# Patient Record
Sex: FEMALE | Race: WHITE | NOT HISPANIC OR LATINO | URBAN - METROPOLITAN AREA
[De-identification: names, ages, dates, MRNs, and addresses within clinical notes are randomized per-mention and may not be internally consistent; named-entity substitution may affect disease eponyms.]

---

## 2017-03-25 ENCOUNTER — INPATIENT (INPATIENT)
Facility: HOSPITAL | Age: 82
LOS: 2 days | Discharge: HOME | DRG: 57 | End: 2017-03-28
Attending: SPECIALIST | Admitting: SPECIALIST
Payer: MEDICARE

## 2017-03-25 VITALS
HEART RATE: 95 BPM | SYSTOLIC BLOOD PRESSURE: 198 MMHG | TEMPERATURE: 98 F | RESPIRATION RATE: 18 BRPM | OXYGEN SATURATION: 100 % | DIASTOLIC BLOOD PRESSURE: 108 MMHG
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
## 2017-03-25 DIAGNOSIS — E03.9 HYPOTHYROIDISM, UNSPECIFIED: ICD-10-CM

## 2017-03-25 DIAGNOSIS — I63.9 CEREBRAL INFARCTION, UNSPECIFIED: ICD-10-CM

## 2017-03-25 DIAGNOSIS — N17.9 ACUTE KIDNEY FAILURE, UNSPECIFIED: ICD-10-CM

## 2017-03-25 DIAGNOSIS — D72.829 ELEVATED WHITE BLOOD CELL COUNT, UNSPECIFIED: ICD-10-CM

## 2017-03-25 LAB
ALBUMIN SERPL ELPH-MCNC: 4.1 G/DL — SIGNIFICANT CHANGE UP (ref 3.4–5)
ALP SERPL-CCNC: 131 U/L — HIGH (ref 40–120)
ALT FLD-CCNC: 17 U/L — SIGNIFICANT CHANGE UP (ref 12–42)
ANION GAP SERPL CALC-SCNC: 9 MMOL/L — SIGNIFICANT CHANGE UP (ref 9–16)
APTT BLD: 33.2 SEC — SIGNIFICANT CHANGE UP (ref 27.5–37.4)
AST SERPL-CCNC: 12 U/L — LOW (ref 15–37)
BASOPHILS NFR BLD AUTO: 0.1 % — SIGNIFICANT CHANGE UP (ref 0–2)
BILIRUB SERPL-MCNC: 0.5 MG/DL — SIGNIFICANT CHANGE UP (ref 0.2–1.2)
BLD GP AB SCN SERPL QL: NEGATIVE — SIGNIFICANT CHANGE UP
BUN SERPL-MCNC: 53 MG/DL — HIGH (ref 7–23)
CALCIUM SERPL-MCNC: 9.2 MG/DL — SIGNIFICANT CHANGE UP (ref 8.5–10.5)
CHLORIDE SERPL-SCNC: 109 MMOL/L — HIGH (ref 96–108)
CK MB CFR SERPL CALC: 2.9 NG/ML — SIGNIFICANT CHANGE UP (ref 0.5–3.6)
CK SERPL-CCNC: 76 U/L — SIGNIFICANT CHANGE UP (ref 26–192)
CO2 SERPL-SCNC: 20 MMOL/L — LOW (ref 22–31)
CREAT SERPL-MCNC: 1.47 MG/DL — HIGH (ref 0.5–1.3)
GLUCOSE SERPL-MCNC: 88 MG/DL — SIGNIFICANT CHANGE UP (ref 70–99)
HCT VFR BLD CALC: 42.6 % — SIGNIFICANT CHANGE UP (ref 34.5–45)
HGB BLD-MCNC: 14.3 G/DL — SIGNIFICANT CHANGE UP (ref 11.5–15.5)
INR BLD: 1.08 — SIGNIFICANT CHANGE UP (ref 0.88–1.16)
LYMPHOCYTES # BLD AUTO: 59.9 % — HIGH (ref 13–44)
MCHC RBC-ENTMCNC: 30.4 PG — SIGNIFICANT CHANGE UP (ref 27–34)
MCHC RBC-ENTMCNC: 33.6 G/DL — SIGNIFICANT CHANGE UP (ref 32–36)
MCV RBC AUTO: 90.6 FL — SIGNIFICANT CHANGE UP (ref 80–100)
MONOCYTES NFR BLD AUTO: 3.9 % — SIGNIFICANT CHANGE UP (ref 2–14)
NEUTROPHILS NFR BLD AUTO: 35.7 % — LOW (ref 43–77)
PLATELET # BLD AUTO: 250 K/UL — SIGNIFICANT CHANGE UP (ref 150–400)
POTASSIUM SERPL-MCNC: 5.1 MMOL/L — SIGNIFICANT CHANGE UP (ref 3.5–5.3)
POTASSIUM SERPL-SCNC: 5.1 MMOL/L — SIGNIFICANT CHANGE UP (ref 3.5–5.3)
PROT SERPL-MCNC: 9.2 G/DL — HIGH (ref 6.4–8.2)
PROTHROM AB SERPL-ACNC: 12 SEC — SIGNIFICANT CHANGE UP (ref 9.8–12.7)
RBC # BLD: 4.7 M/UL — SIGNIFICANT CHANGE UP (ref 3.8–5.2)
RBC # FLD: 14.6 % — SIGNIFICANT CHANGE UP (ref 10.3–16.9)
RH IG SCN BLD-IMP: POSITIVE — SIGNIFICANT CHANGE UP
SODIUM SERPL-SCNC: 138 MMOL/L — SIGNIFICANT CHANGE UP (ref 135–145)
TROPONIN I SERPL-MCNC: <0.015 NG/ML — SIGNIFICANT CHANGE UP (ref 0.01–0.04)
WBC # BLD: 15.9 K/UL — HIGH (ref 3.8–10.5)
WBC # FLD AUTO: 15.9 K/UL — HIGH (ref 3.8–10.5)

## 2017-03-25 PROCEDURE — 93010 ELECTROCARDIOGRAM REPORT: CPT

## 2017-03-25 PROCEDURE — 70450 CT HEAD/BRAIN W/O DYE: CPT | Mod: 26

## 2017-03-25 PROCEDURE — 99285 EMERGENCY DEPT VISIT HI MDM: CPT | Mod: 25

## 2017-03-25 PROCEDURE — 73030 X-RAY EXAM OF SHOULDER: CPT | Mod: 26,LT

## 2017-03-25 PROCEDURE — 71010: CPT | Mod: 26

## 2017-03-25 RX ORDER — SODIUM CHLORIDE 9 MG/ML
1000 INJECTION INTRAMUSCULAR; INTRAVENOUS; SUBCUTANEOUS
Qty: 0 | Refills: 0 | Status: DISCONTINUED | OUTPATIENT
Start: 2017-03-25 | End: 2017-03-26

## 2017-03-25 RX ORDER — SODIUM CHLORIDE 9 MG/ML
3 INJECTION INTRAMUSCULAR; INTRAVENOUS; SUBCUTANEOUS ONCE
Qty: 0 | Refills: 0 | Status: COMPLETED | OUTPATIENT
Start: 2017-03-25 | End: 2017-03-25

## 2017-03-25 RX ORDER — HEPARIN SODIUM 5000 [USP'U]/ML
5000 INJECTION INTRAVENOUS; SUBCUTANEOUS EVERY 12 HOURS
Qty: 0 | Refills: 0 | Status: DISCONTINUED | OUTPATIENT
Start: 2017-03-25 | End: 2017-03-28

## 2017-03-25 RX ORDER — HALOPERIDOL DECANOATE 100 MG/ML
2 INJECTION INTRAMUSCULAR ONCE
Qty: 0 | Refills: 0 | Status: COMPLETED | OUTPATIENT
Start: 2017-03-25 | End: 2017-03-25

## 2017-03-25 RX ORDER — DIPHENHYDRAMINE HCL 50 MG
25 CAPSULE ORAL ONCE
Qty: 0 | Refills: 0 | Status: COMPLETED | OUTPATIENT
Start: 2017-03-25 | End: 2017-03-25

## 2017-03-25 RX ADMIN — SODIUM CHLORIDE 125 MILLILITER(S): 9 INJECTION INTRAMUSCULAR; INTRAVENOUS; SUBCUTANEOUS at 20:39

## 2017-03-25 RX ADMIN — SODIUM CHLORIDE 3 MILLILITER(S): 9 INJECTION INTRAMUSCULAR; INTRAVENOUS; SUBCUTANEOUS at 20:39

## 2017-03-25 RX ADMIN — Medication 25 MILLIGRAM(S): at 22:23

## 2017-03-25 RX ADMIN — SODIUM CHLORIDE 125 MILLILITER(S): 9 INJECTION INTRAMUSCULAR; INTRAVENOUS; SUBCUTANEOUS at 20:52

## 2017-03-25 NOTE — H&P ADULT - NSHPLABSRESULTS_GEN_ALL_CORE
.  LABS:                         14.3   15.9  )-----------( 250      ( 25 Mar 2017 20:44 )             42.6     25 Mar 2017 20:44    138    |  109    |  53     ----------------------------<  88     5.1     |  20     |  1.47     Ca    9.2        25 Mar 2017 20:44    TPro  9.2    /  Alb  4.1    /  TBili  0.5    /  DBili  x      /  AST  12     /  ALT  17     /  AlkPhos  131    25 Mar 2017 20:44    PT/INR - ( 25 Mar 2017 20:44 )   PT: 12.0 sec;   INR: 1.08          PTT - ( 25 Mar 2017 20:44 )  PTT:33.2 sec              RADIOLOGY, EKG & ADDITIONAL TESTS: Reviewed. .  LABS:                         14.3   15.9  )-----------( 250      ( 25 Mar 2017 20:44 )             42.6     25 Mar 2017 20:44    138    |  109    |  53     ----------------------------<  88     5.1     |  20     |  1.47     Ca    9.2        25 Mar 2017 20:44    TPro  9.2    /  Alb  4.1    /  TBili  0.5    /  DBili  x      /  AST  12     /  ALT  17     /  AlkPhos  131    25 Mar 2017 20:44    PT/INR - ( 25 Mar 2017 20:44 )   PT: 12.0 sec;   INR: 1.08          PTT - ( 25 Mar 2017 20:44 )  PTT:33.2 sec      EXAM:  CT BRAIN                          PROCEDURE DATE:  03/25/2017         IMPRESSION:  No hydrocephalus, midline shift, acute intracranial hemorrhage or   demarcated territorial infarct.  Chronic white matter microangiopathic ischemic disease. Chronic right   thalamic lacunar infarct, also noted on the prior study.      CXR: no official read. +vasc congestion, no infiltrates.  EKG: NSR with incomplete LBBB.

## 2017-03-25 NOTE — H&P ADULT - PROBLEM SELECTOR PLAN 2
-will get urine lytes, pt more on dry side on exam  -has no urinary symptoms  -will get collateral from Dr. Fuller in AM for baseline labs  -renally adjust all meds and avoid nephrotoxic agents

## 2017-03-25 NOTE — H&P ADULT - PROBLEM SELECTOR PLAN 6
-will check TSH  -will try to confirm synthroid dose with Dr. Fuller in AM-patient unaware of dose. As per outpt meds patient was taking 50mcg since September so will continue this dose for now pending TSH

## 2017-03-25 NOTE — ED ADULT TRIAGE NOTE - CHIEF COMPLAINT QUOTE
Pt co left arm and shoulder pain status post fall in the restroom. Pt Pt co left arm and shoulder pain status post fall at home. Pt has left side facial droop with R pupil nonreactive to light. Garcia injury to the head. Oriented to Person and place. L shoulder appear deformed. Limited ROM to L arm and shoulder.

## 2017-03-25 NOTE — H&P ADULT - PROBLEM SELECTOR PLAN 5
-will check TSH  -will try to confirm synthroid dose with Dr. Fuller in AM-patient unaware of dose. As per outpt meds patient was taking 50mcg since September so will continue this dose for now pending TSH -old CVA found on CT  -patient would benefit from aspirin and possibly statin however given's patient age and non-compliance pt may not be ideal candidate for starting new medications  -will check lipid panel

## 2017-03-25 NOTE — H&P ADULT - PMH
CVA (cerebral vascular accident)    Hypertension    Hypothyroidism    Movement disorder CVA (cerebral vascular accident)    Dementia    Hypertension    Hypothyroidism    Movement disorder

## 2017-03-25 NOTE — ED PROVIDER NOTE - OBJECTIVE STATEMENT
89 F co fall- was in the bathroom, stood up and fell- does not recall being dizzy/lightheaded denies LOC- states she lives alone- but reportedly living with someone who is unable to care for her  denies pain no n/v no weakness- co chronic shoulder immobility for years- uncertain of the reason  no exac/allev factors

## 2017-03-25 NOTE — H&P ADULT - NSHPPHYSICALEXAM_GEN_ALL_CORE
.  VITAL SIGNS:  T(C): 36.7, Max: 36.7 (03-25 @ 22:29)  T(F): 98, Max: 98 (03-25 @ 22:29)  HR: 84 (84 - 95)  BP: 169/74 (169/74 - 198/108)  BP(mean): --  RR: 18 (18 - 18)  SpO2: 100% (100% - 100%)  Wt(kg): --    PHYSICAL EXAM:    Constitutional: WDWN resting comfortably in bed; NAD  Head: NC/AT  Eyes: PERRL, EOMI, anicteric sclera  ENT: no nasal discharge; uvula midline, no oropharyngeal erythema or exudates; MMM  Neck: supple; no JVD or thyromegaly  Respiratory: CTA B/L; no W/R/R, no retractions  Cardiac: +S1/S2; RRR; no M/R/G; PMI non-displaced  Gastrointestinal: soft, NT/ND; no rebound or guarding; +BSx4  Genitourinary: normal external genitalia  Back: spine midline, no bony tenderness or step-offs; no CVAT B/L  Extremities: WWP, no clubbing or cyanosis; no peripheral edema  Musculoskeletal: NROM x4; no joint swelling, tenderness or erythema  Vascular: 2+ radial, femoral, DP/PT pulses B/L  Dermatologic: skin warm, dry and intact; no rashes, wounds, or scars  Lymphatic: no submandibular or cervical LAD  Neurologic: AAOx3; CNII-XII grossly intact; no focal deficits  Psychiatric: affect and characteristics of appearance, verbalizations, behaviors are appropriate .  VITAL SIGNS:  T(C): 36.7, Max: 36.7 (03-25 @ 22:29)  T(F): 98, Max: 98 (03-25 @ 22:29)  HR: 84 (84 - 95)  BP: 169/74 (169/74 - 198/108)  BP(mean): --  RR: 18 (18 - 18)  SpO2: 100% (100% - 100%)  Wt(kg): --    PHYSICAL EXAM:    Constitutional: WDWN resting comfortably in bed; NAD. Frail.  Head: NC/AT  Eyes: PERRL, EOMI, anicteric sclera  ENT: no nasal discharge; uvula midline, no oropharyngeal erythema or exudates; dry MM. No lesions or signs of injury.  Neck: supple; no JVD or thyromegaly  Respiratory: CTA B/L; no W/R/R, no retractions  Cardiac: +S1/S2; RRR; +2/6 sys murmur; PMI non-displaced  Gastrointestinal: soft, NT/ND; no rebound or guarding; +BSx4  Genitourinary: normal external genitalia  Back: spine midline, no bony tenderness or step-offs; no CVAT B/L  Extremities: WWP, no clubbing or cyanosis; no peripheral edema  Musculoskeletal: NROM x3; no joint swelling, tenderness or erythema. mild limitation of ROM at left shoulder.  Vascular: 2+ radial, femoral, DP/PT pulses B/L  Dermatologic: skin warm, dry and intact; no rashes, wounds, or scars  Lymphatic: no submandibular or cervical LAD  Neurologic: AAOx2; CNII-XII grossly intact; no focal deficits  Psychiatric: affect and characteristics of appearance, verbalizations, behaviors are appropriate .  VITAL SIGNS:  T(C): 36.7, Max: 36.7 (03-25 @ 22:29)  T(F): 98, Max: 98 (03-25 @ 22:29)  HR: 84 (84 - 95)  BP: 169/74 (169/74 - 198/108)  BP(mean): --  RR: 18 (18 - 18)  SpO2: 100% (100% - 100%)  Wt(kg): --    PHYSICAL EXAM:    Constitutional: WDWN resting comfortably in bed; NAD. Frail.  Head: NC/AT  Eyes: PERRL, EOMI, anicteric sclera  ENT: no nasal discharge; uvula midline, no oropharyngeal erythema or exudates; dry MM. No lesions or signs of injury.  Neck: supple; no JVD or thyromegaly  Respiratory: CTA B/L; no W/R/R, no retractions  Cardiac: +S1/S2; RRR; +2/6 sys murmur; PMI non-displaced  Gastrointestinal: soft, NT/ND; no rebound or guarding; +BSx4  Genitourinary: normal external genitalia  Back: spine midline, no bony tenderness or step-offs; no CVAT B/L  Extremities: WWP, no clubbing or cyanosis; no peripheral edema  Musculoskeletal: NROM x3; no joint swelling, tenderness or erythema. mild limitation of ROM at left shoulder.  Vascular: 2+ radial, femoral, DP/PT pulses B/L  Dermatologic: skin warm, dry and intact; no rashes, wounds, or scars  Lymphatic: no submandibular or cervical LAD  Neurologic: AAOx2; CNII-XII grossly intact; no focal deficits. +facial tremor and b/l hands-chronic. Strength 5/5 in all ext. Sensation intact.   Psychiatric: affect and characteristics of appearance, verbalizations, behaviors are appropriate

## 2017-03-25 NOTE — ED PROVIDER NOTE - MUSCULOSKELETAL, MLM
Spine appears normal, range of motion is not limited, no muscle or joint tenderness L shoulder probable dislocation severely limited ROM deneis pain, pulses intact

## 2017-03-25 NOTE — H&P ADULT - PROBLEM SELECTOR PLAN 1
-unclear etiology currently. Initial workup shows possible infection with white count although patient has no symptoms. Will get UA, CXR clear.  -EKG shows NSR, will r/o ACS with trops X2.   -Will get TSH  -will check orthostatics although seems as if patient was sitting during episode  -Pt frail and lives alone-will get PT consult and social work involved

## 2017-03-25 NOTE — H&P ADULT - ASSESSMENT
Patient is a 89 yo F with PMHx of hypothyroidism, HLD, hypercoagulable state, ?movement disorder, chronic left shoulder dislocation with fall. Patient is a 89 yo F with PMHx of dementia, hypothyroidism, HLD, hypercoagulable state, ?movement disorder, chronic left shoulder dislocation with fall.

## 2017-03-25 NOTE — H&P ADULT - HISTORY OF PRESENT ILLNESS
Patient is a 89 yo F with PMHx of hypothyroidism, HLD, hypercoagulable state, ?movement disorder, chronic left shoulder dislocation who presented to ED today after having fall in bathroom. Pt states she was sitting and then fell over and landing on her front. She denies any injury. She remembers the incident and denies any prodromal symptoms including chest pain, palpitations, SOB, diaphoresis. She denies any URI infection symptoms, fevers, chills, abd pain complaints or urinary complaints. No rashes. She denies any neurologic symptoms    She only takes synthroid at home. Has stopped all other meds on her own bc of side effects she states. She was told she has a movement disorder but has not been taking anything for it (outpt med rec shows old script of Sinemet). She denies having CVA in past. She usually ambulates with a walker. She has someone come once a week to clean her apartment but other wise she manages her own affairs including cooking. She lives in an elevator building. Patient is a 89 yo F with PMHx of dementia, hypothyroidism, HLD, hypercoagulable state, ?movement disorder, chronic left shoulder dislocation who presented to ED today after having fall in bathroom. Pt states she was sitting and then fell over and landing on her front. She denies any injury. She remembers the incident and denies any prodromal symptoms including chest pain, palpitations, SOB, diaphoresis. She denies any URI infection symptoms, fevers, chills, abd pain complaints or urinary complaints. No rashes. She denies any neurologic symptoms    She only takes synthroid at home. Has stopped all other meds on her own bc of side effects she states. She was told she has a movement disorder but has not been taking anything for it (outpt med rec shows old script of Sinemet). She denies having CVA in past. She usually ambulates with a walker. She has someone come once a week to clean her apartment but other wise she manages her own affairs including cooking. She lives in an elevator building.

## 2017-03-26 DIAGNOSIS — R63.8 OTHER SYMPTOMS AND SIGNS CONCERNING FOOD AND FLUID INTAKE: ICD-10-CM

## 2017-03-26 DIAGNOSIS — Z86.718 PERSONAL HISTORY OF OTHER VENOUS THROMBOSIS AND EMBOLISM: ICD-10-CM

## 2017-03-26 DIAGNOSIS — R74.8 ABNORMAL LEVELS OF OTHER SERUM ENZYMES: ICD-10-CM

## 2017-03-26 DIAGNOSIS — R01.1 CARDIAC MURMUR, UNSPECIFIED: ICD-10-CM

## 2017-03-26 DIAGNOSIS — Z41.8 ENCOUNTER FOR OTHER PROCEDURES FOR PURPOSES OTHER THAN REMEDYING HEALTH STATE: ICD-10-CM

## 2017-03-26 DIAGNOSIS — I10 ESSENTIAL (PRIMARY) HYPERTENSION: ICD-10-CM

## 2017-03-26 DIAGNOSIS — G25.9 EXTRAPYRAMIDAL AND MOVEMENT DISORDER, UNSPECIFIED: ICD-10-CM

## 2017-03-26 LAB
ALBUMIN SERPL ELPH-MCNC: 2.9 G/DL — LOW (ref 3.4–5)
ALP SERPL-CCNC: 100 U/L — SIGNIFICANT CHANGE UP (ref 40–120)
ALT FLD-CCNC: 13 U/L — SIGNIFICANT CHANGE UP (ref 12–42)
ANION GAP SERPL CALC-SCNC: 10 MMOL/L — SIGNIFICANT CHANGE UP (ref 9–16)
AST SERPL-CCNC: 12 U/L — LOW (ref 15–37)
BILIRUB SERPL-MCNC: 0.7 MG/DL — SIGNIFICANT CHANGE UP (ref 0.2–1.2)
BUN SERPL-MCNC: 51 MG/DL — HIGH (ref 7–23)
CALCIUM SERPL-MCNC: 8.3 MG/DL — LOW (ref 8.5–10.5)
CHLORIDE SERPL-SCNC: 113 MMOL/L — HIGH (ref 96–108)
CHOLEST SERPL-MCNC: 155 MG/DL — SIGNIFICANT CHANGE UP
CK SERPL-CCNC: 69 U/L — SIGNIFICANT CHANGE UP (ref 26–192)
CO2 SERPL-SCNC: 19 MMOL/L — LOW (ref 22–31)
CREAT SERPL-MCNC: 1.4 MG/DL — HIGH (ref 0.5–1.3)
GLUCOSE SERPL-MCNC: 75 MG/DL — SIGNIFICANT CHANGE UP (ref 70–99)
HCT VFR BLD CALC: 35.9 % — SIGNIFICANT CHANGE UP (ref 34.5–45)
HDLC SERPL-MCNC: 34 MG/DL — LOW
HGB BLD-MCNC: 11.7 G/DL — SIGNIFICANT CHANGE UP (ref 11.5–15.5)
LIPID PNL WITH DIRECT LDL SERPL: 87 MG/DL — SIGNIFICANT CHANGE UP
MAGNESIUM SERPL-MCNC: 1.9 MG/DL — SIGNIFICANT CHANGE UP (ref 1.6–2.4)
MCHC RBC-ENTMCNC: 30 PG — SIGNIFICANT CHANGE UP (ref 27–34)
MCHC RBC-ENTMCNC: 32.6 G/DL — SIGNIFICANT CHANGE UP (ref 32–36)
MCV RBC AUTO: 92.1 FL — SIGNIFICANT CHANGE UP (ref 80–100)
PLATELET # BLD AUTO: 247 K/UL — SIGNIFICANT CHANGE UP (ref 150–400)
POTASSIUM SERPL-MCNC: 5.1 MMOL/L — SIGNIFICANT CHANGE UP (ref 3.5–5.3)
POTASSIUM SERPL-SCNC: 5.1 MMOL/L — SIGNIFICANT CHANGE UP (ref 3.5–5.3)
PROT SERPL-MCNC: 6.9 G/DL — SIGNIFICANT CHANGE UP (ref 6.4–8.2)
RBC # BLD: 3.9 M/UL — SIGNIFICANT CHANGE UP (ref 3.8–5.2)
RBC # FLD: 14 % — SIGNIFICANT CHANGE UP (ref 10.3–16.9)
SODIUM SERPL-SCNC: 142 MMOL/L — SIGNIFICANT CHANGE UP (ref 135–145)
TOTAL CHOLESTEROL/HDL RATIO MEASUREMENT: 4.6 RATIO — SIGNIFICANT CHANGE UP
TRIGL SERPL-MCNC: 170 MG/DL — HIGH
TROPONIN I SERPL-MCNC: 0.09 NG/ML — HIGH (ref 0.01–0.04)
TROPONIN I SERPL-MCNC: 0.23 NG/ML — HIGH (ref 0.01–0.04)
TSH SERPL-MCNC: 1.82 UIU/ML — SIGNIFICANT CHANGE UP (ref 0.35–4.94)
WBC # BLD: 15.5 K/UL — HIGH (ref 3.8–10.5)
WBC # FLD AUTO: 15.5 K/UL — HIGH (ref 3.8–10.5)

## 2017-03-26 RX ORDER — AMLODIPINE BESYLATE 2.5 MG/1
5 TABLET ORAL DAILY
Qty: 0 | Refills: 0 | Status: COMPLETED | OUTPATIENT
Start: 2017-03-26 | End: 2017-03-26

## 2017-03-26 RX ORDER — LEVOTHYROXINE SODIUM 125 MCG
50 TABLET ORAL DAILY
Qty: 0 | Refills: 0 | Status: DISCONTINUED | OUTPATIENT
Start: 2017-03-26 | End: 2017-03-28

## 2017-03-26 RX ORDER — SODIUM CHLORIDE 9 MG/ML
1000 INJECTION INTRAMUSCULAR; INTRAVENOUS; SUBCUTANEOUS
Qty: 0 | Refills: 0 | Status: DISCONTINUED | OUTPATIENT
Start: 2017-03-26 | End: 2017-03-27

## 2017-03-26 RX ORDER — AMLODIPINE BESYLATE 2.5 MG/1
5 TABLET ORAL DAILY
Qty: 0 | Refills: 0 | Status: DISCONTINUED | OUTPATIENT
Start: 2017-03-27 | End: 2017-03-28

## 2017-03-26 RX ORDER — LEVOTHYROXINE SODIUM 125 MCG
1 TABLET ORAL
Qty: 0 | Refills: 0 | COMMUNITY

## 2017-03-26 RX ADMIN — HEPARIN SODIUM 5000 UNIT(S): 5000 INJECTION INTRAVENOUS; SUBCUTANEOUS at 07:01

## 2017-03-26 RX ADMIN — AMLODIPINE BESYLATE 5 MILLIGRAM(S): 2.5 TABLET ORAL at 07:00

## 2017-03-26 RX ADMIN — SODIUM CHLORIDE 80 MILLILITER(S): 9 INJECTION INTRAMUSCULAR; INTRAVENOUS; SUBCUTANEOUS at 22:04

## 2017-03-26 RX ADMIN — HEPARIN SODIUM 5000 UNIT(S): 5000 INJECTION INTRAVENOUS; SUBCUTANEOUS at 18:54

## 2017-03-26 RX ADMIN — Medication 50 MICROGRAM(S): at 07:01

## 2017-03-26 NOTE — PHYSICAL THERAPY INITIAL EVALUATION ADULT - IMPAIRMENTS FOUND, PT EVAL
poor safety awareness/aerobic capacity/endurance/gait, locomotion, and balance/muscle strength/joint integrity and mobility/gross motor/posture

## 2017-03-26 NOTE — PHYSICAL THERAPY INITIAL EVALUATION ADULT - ADDITIONAL COMMENTS
Patient lives alone in an elevator apartment. Patient reports ambulating minimally prior to admission with a RW. However, patient is a poor historian.

## 2017-03-26 NOTE — PROGRESS NOTE ADULT - SUBJECTIVE AND OBJECTIVE BOX
Patient is a 89 yo F with PMHx of mild dementia functional, hypothyroidism, HLD, hypercoagulable state, ?movement disorder, chronic left shoulder dislocation who presented to ED today after having fall in bathroom. Pt states she was sitting and then fell over and landing on her front. She denies any injury. She remembers the incident and denies any prodromal symptoms including chest pain, palpitations, SOB, diaphoresis. She denies any URI infection symptoms, fevers, chills, abd pain complaints or urinary complaints. No rashes. She denies any neurologic symptoms    She only takes synthroid at home. Has stopped all other meds on her own bc of side effects she states. She was told she has a movement disorder but has not been taking anything for it (outpt med rec shows old script of Sinemet). She denies having CVA in past. She usually ambulates with a walker. She has someone come once a week to clean her apartment but other wise she manages her own affairs including cooking. She lives in an elevator building. (25 Mar 2017 23:36)      REVIEW OF SYSTEMS:  Constitutional: No fever, weight loss or fatigue  ENMT:  No difficulty hearing, tinnitus, vertigo; No sinus or throat pain  Respiratory: No cough, wheezing, chills or hemoptysis  Cardiovascular: No chest pain, palpitations, dizziness or leg swelling  Gastrointestinal: No abdominal or epigastric pain. No nausea, vomiting or hematemesis; No diarrhea or constipation. No melena or hematochezia.  Skin: No itching, burning, rashes or lesions   Musculoskeletal: No joint pain or swelling; No muscle, back or extremity pain    PAST MEDICAL & SURGICAL HISTORY:  DVT with hypercoagulable state not on AC because of risk of fall  Dementia  CVA (cerebral vascular accident)  Movement disorder (Parkinsons)  Hypertension  Hypothyroidism      FAMILY HISTORY:      SOCIAL HISTORY:  Smoking Status: [ ] Current, [ ] Former, [ ] Never  Pack Years:    MEDICATIONS:  MEDICATIONS  (STANDING):  heparin  Injectable 5000Unit(s) SubCutaneous every 12 hours  levothyroxine 50MICROGram(s) Oral daily  sodium chloride 0.9%. 1000milliLiter(s) IV Continuous <Continuous>    MEDICATIONS  (PRN):      Allergies    No Known Allergies    Intolerances        Vital Signs Last 24 Hrs  T(C): 36.6, Max: 37.1 (03-25 @ 23:30)  T(F): 97.8, Max: 98.7 (03-25 @ 23:30)  HR: 76 (62 - 95)  BP: 134/61 (134/61 - 198/108)  BP(mean): --  RR: 20 (18 - 20)  SpO2: 98% (97% - 100%)  I & Os for 24h ending 03-26 @ 07:00  =============================================  IN: 767.5 ml / OUT: 0 ml / NET: 767.5 ml    I & Os for current day (as of 03-26 @ 10:20)  =============================================  IN: 80 ml / OUT: 0 ml / NET: 80 ml        PHYSICAL EXAM:    General: Well developed; well nourished; elderly, oriented,  in no acute distress  HEENT: MMM, conjunctiva and sclera clear  Lungs: poor inspiratory effort but clear  Heart: + murmur  Gastrointestinal: Soft, non-tender non-distended; Normal bowel sounds; No rebound or guarding  Extremities: Normal range of motion, No clubbing, cyanosis or edema  Neurological: Alert and oriented x3  Skin: Warm and dry. No obvious rash      LABS:                        11.7   15.5  )-----------( 247      ( 26 Mar 2017 06:41 )             35.9     26 Mar 2017 06:41    142    |  113    |  51     ----------------------------<  75     5.1     |  19     |  1.40     Ca    8.3        26 Mar 2017 06:41  Mg     1.9       26 Mar 2017 06:41    TPro  6.9    /  Alb  2.9    /  TBili  0.7    /  DBili  x      /  AST  12     /  ALT  13     /  AlkPhos  100    26 Mar 2017 06:41          RADIOLOGY & ADDITIONAL STUDIES:

## 2017-03-26 NOTE — PHYSICAL THERAPY INITIAL EVALUATION ADULT - PLANNED THERAPY INTERVENTIONS, PT EVAL
bed mobility training/transfer training/strengthening/balance training/gait training/postural re-education

## 2017-03-27 LAB
ANION GAP SERPL CALC-SCNC: 8 MMOL/L — LOW (ref 9–16)
ANISOCYTOSIS BLD QL: SLIGHT — SIGNIFICANT CHANGE UP
APPEARANCE UR: CLEAR — SIGNIFICANT CHANGE UP
BILIRUB UR-MCNC: NEGATIVE — SIGNIFICANT CHANGE UP
BUN SERPL-MCNC: 40 MG/DL — HIGH (ref 7–23)
BURR CELLS BLD QL SMEAR: SIGNIFICANT CHANGE UP
CALCIUM SERPL-MCNC: 8.2 MG/DL — LOW (ref 8.5–10.5)
CHLORIDE SERPL-SCNC: 115 MMOL/L — HIGH (ref 96–108)
CO2 SERPL-SCNC: 19 MMOL/L — LOW (ref 22–31)
COLOR SPEC: YELLOW — SIGNIFICANT CHANGE UP
CREAT ?TM UR-MCNC: 45.2 MG/DL — SIGNIFICANT CHANGE UP
CREAT SERPL-MCNC: 1.2 MG/DL — SIGNIFICANT CHANGE UP (ref 0.5–1.3)
DIFF PNL FLD: (no result)
EOSINOPHIL NFR BLD AUTO: 2 % — SIGNIFICANT CHANGE UP (ref 0–6)
GLUCOSE SERPL-MCNC: 136 MG/DL — HIGH (ref 70–99)
GLUCOSE UR QL: NEGATIVE — SIGNIFICANT CHANGE UP
HCT VFR BLD CALC: 35.9 % — SIGNIFICANT CHANGE UP (ref 34.5–45)
HGB BLD-MCNC: 11.5 G/DL — SIGNIFICANT CHANGE UP (ref 11.5–15.5)
HYPOCHROMIA BLD QL: SLIGHT — SIGNIFICANT CHANGE UP
KETONES UR-MCNC: NEGATIVE — SIGNIFICANT CHANGE UP
LEUKOCYTE ESTERASE UR-ACNC: NEGATIVE — SIGNIFICANT CHANGE UP
LG PLATELETS BLD QL AUTO: PRESENT — SIGNIFICANT CHANGE UP
LYMPHOCYTES # BLD AUTO: 57 % — HIGH (ref 13–44)
LYMPHOCYTES # SPEC AUTO: 2 % — SIGNIFICANT CHANGE UP
MACROCYTES BLD QL: SLIGHT — SIGNIFICANT CHANGE UP
MAGNESIUM SERPL-MCNC: 1.8 MG/DL — SIGNIFICANT CHANGE UP (ref 1.6–2.4)
MANUAL DIF COMMENT BLD-IMP: SIGNIFICANT CHANGE UP
MANUAL SMEAR VERIFICATION: YES — SIGNIFICANT CHANGE UP
MCHC RBC-ENTMCNC: 29.9 PG — SIGNIFICANT CHANGE UP (ref 27–34)
MCHC RBC-ENTMCNC: 32 G/DL — SIGNIFICANT CHANGE UP (ref 32–36)
MCV RBC AUTO: 93.2 FL — SIGNIFICANT CHANGE UP (ref 80–100)
MICROCYTES BLD QL: SLIGHT — SIGNIFICANT CHANGE UP
MONOCYTES NFR BLD AUTO: 1 % — LOW (ref 2–14)
NEUTROPHILS NFR BLD AUTO: 38 % — LOW (ref 43–77)
NITRITE UR-MCNC: NEGATIVE — SIGNIFICANT CHANGE UP
OVALOCYTES BLD QL SMEAR: SLIGHT — SIGNIFICANT CHANGE UP
PH UR: 5.5 — SIGNIFICANT CHANGE UP (ref 4–8)
PLAT MORPH BLD: (no result)
PLATELET # BLD AUTO: 224 K/UL — SIGNIFICANT CHANGE UP (ref 150–400)
POIKILOCYTOSIS BLD QL AUTO: SLIGHT — SIGNIFICANT CHANGE UP
POTASSIUM SERPL-MCNC: 4.7 MMOL/L — SIGNIFICANT CHANGE UP (ref 3.5–5.3)
POTASSIUM SERPL-SCNC: 4.7 MMOL/L — SIGNIFICANT CHANGE UP (ref 3.5–5.3)
PROT UR-MCNC: NEGATIVE MG/DL — SIGNIFICANT CHANGE UP
RBC # BLD: 3.85 M/UL — SIGNIFICANT CHANGE UP (ref 3.8–5.2)
RBC # FLD: 14.7 % — SIGNIFICANT CHANGE UP (ref 10.3–16.9)
RBC BLD AUTO: (no result)
SMUDGE CELLS # BLD: SIGNIFICANT CHANGE UP
SODIUM SERPL-SCNC: 142 MMOL/L — SIGNIFICANT CHANGE UP (ref 135–145)
SODIUM UR-SCNC: 114 MMOL/L — SIGNIFICANT CHANGE UP
SP GR SPEC: 1.01 — SIGNIFICANT CHANGE UP (ref 1–1.03)
SPHEROCYTES BLD QL SMEAR: SLIGHT — SIGNIFICANT CHANGE UP
UROBILINOGEN FLD QL: 0.2 E.U./DL — SIGNIFICANT CHANGE UP
WBC # BLD: 12.5 K/UL — HIGH (ref 3.8–10.5)
WBC # FLD AUTO: 12.5 K/UL — HIGH (ref 3.8–10.5)

## 2017-03-27 PROCEDURE — 73562 X-RAY EXAM OF KNEE 3: CPT | Mod: 26,LT

## 2017-03-27 RX ORDER — MAGNESIUM SULFATE 500 MG/ML
1 VIAL (ML) INJECTION ONCE
Qty: 0 | Refills: 0 | Status: COMPLETED | OUTPATIENT
Start: 2017-03-27 | End: 2017-03-27

## 2017-03-27 RX ORDER — ACETAMINOPHEN 500 MG
650 TABLET ORAL EVERY 6 HOURS
Qty: 0 | Refills: 0 | Status: DISCONTINUED | OUTPATIENT
Start: 2017-03-27 | End: 2017-03-28

## 2017-03-27 RX ADMIN — Medication 100 GRAM(S): at 19:01

## 2017-03-27 RX ADMIN — AMLODIPINE BESYLATE 5 MILLIGRAM(S): 2.5 TABLET ORAL at 06:45

## 2017-03-27 RX ADMIN — Medication 50 MICROGRAM(S): at 06:45

## 2017-03-27 RX ADMIN — HEPARIN SODIUM 5000 UNIT(S): 5000 INJECTION INTRAVENOUS; SUBCUTANEOUS at 06:45

## 2017-03-27 RX ADMIN — HEPARIN SODIUM 5000 UNIT(S): 5000 INJECTION INTRAVENOUS; SUBCUTANEOUS at 19:01

## 2017-03-27 NOTE — PROGRESS NOTE ADULT - PROBLEM SELECTOR PLAN 1
Likely mechanical per history, however initial workup showed isolated leukocytosis - possible infection with white count although patient has no symptoms. Will get UA, CXR clear. TSH wnl.  - EKG shows NSR, will had small trops peaked at 0.227 likely demand ischemia - no anginal sx  - Pt frail and lives alone - likely not safe d/c home, PT recommended [facility]  - D/c'ed IV fluids as pt w/ soft BB crackles and tolerating PO diet Likely mechanical per history, however initial workup showed isolated leukocytosis - possible infection with white count although patient has no symptoms. Will get UA, CXR clear. TSH wnl. XR left shoulder showed hypertrophic arthropathy but no fracture.  - EKG shows NSR, will had small trops peaked at 0.227 likely demand ischemia - no anginal sx  - Pt frail and lives alone - likely not safe d/c home, PT recommended [facility]  - D/c'ed IV fluids as pt w/ soft BB crackles and tolerating PO diet

## 2017-03-27 NOTE — PROGRESS NOTE ADULT - PROBLEM SELECTOR PLAN 8
HSQ    Dispo: Cont to monitor on [facility] and plan for safe d/c to [facility] pending clinical improvement.  FULL CODE HSQ    Dispo: Cont to monitor on [facility] and plan for safe d/c to [facility] pending clinical improvement.  Code: DNR/DNI - see event note by Dr. James Gonzalez from today 3/27/17

## 2017-03-27 NOTE — PROGRESS NOTE ADULT - SUBJECTIVE AND OBJECTIVE BOX
Pt seen and examined   no complaints, feeling better    REVIEW OF SYSTEMS:  Constitutional: No fever, weight loss or fatigue  Cardiovascular: No chest pain, palpitations, dizziness or leg swelling  Gastrointestinal: No abdominal or epigastric pain. No nausea, vomiting or hematemesis; No diarrhea or constipation. No melena or hematochezia.  Skin: No itching, burning, rashes or lesions       MEDICATIONS:  MEDICATIONS  (STANDING):  heparin  Injectable 5000Unit(s) SubCutaneous every 12 hours  levothyroxine 50MICROGram(s) Oral daily  amLODIPine   Tablet 5milliGRAM(s) Oral daily    MEDICATIONS  (PRN):      Allergies    No Known Allergies    Intolerances        Vital Signs Last 24 Hrs  T(C): 36.3, Max: 36.8 (03-26 @ 21:25)  T(F): 97.4, Max: 98.3 (03-26 @ 21:25)  HR: 78 (68 - 86)  BP: 138/59 (138/59 - 195/75)  BP(mean): --  RR: 16 (16 - 20)  SpO2: 96% (96% - 99%)  I & Os for 24h ending 03-27 @ 07:00  =============================================  IN: 1440 ml / OUT: 0 ml / NET: 1440 ml    I & Os for current day (as of 03-27 @ 14:18)  =============================================  IN: 80 ml / OUT: 0 ml / NET: 80 ml      PHYSICAL EXAM:    General: Well developed; well nourished; in no acute distress  HEENT: MMM, conjunctiva and sclera clear  Gastrointestinal: Soft non-tender non-distended; Normal bowel sounds; No hepatosplenomegaly  Skin: Warm and dry. No obvious rash    LABS:      CBC Full  -  ( 27 Mar 2017 11:02 )  WBC Count : 12.5 K/uL  Hemoglobin : 11.5 g/dL  Hematocrit : 35.9 %  Platelet Count - Automated : 224 K/uL  Mean Cell Volume : 93.2 fL  Mean Cell Hemoglobin : 29.9 pg  Mean Cell Hemoglobin Concentration : 32.0 g/dL  Auto Neutrophil # : x  Auto Lymphocyte # : x  Auto Monocyte # : x  Auto Eosinophil # : x  Auto Basophil # : x  Auto Neutrophil % : x  Auto Lymphocyte % : x  Auto Monocyte % : x  Auto Eosinophil % : x  Auto Basophil % : x    27 Mar 2017 11:02    142    |  115    |  40     ----------------------------<  136    4.7     |  19     |  1.20     Ca    8.2        27 Mar 2017 11:02  Mg     1.8       27 Mar 2017 11:02    TPro  6.9    /  Alb  2.9    /  TBili  0.7    /  DBili  x      /  AST  12     /  ALT  13     /  AlkPhos  100    26 Mar 2017 06:41    PT/INR - ( 25 Mar 2017 20:44 )   PT: 12.0 sec;   INR: 1.08          PTT - ( 25 Mar 2017 20:44 )  PTT:33.2 sec                  RADIOLOGY & ADDITIONAL STUDIES (The following images were personally reviewed):

## 2017-03-27 NOTE — CONSULT NOTE ADULT - SUBJECTIVE AND OBJECTIVE BOX
Patient is a 89y old  Female who presents with a chief complaint of fall (25 Mar 2017 23:36)      HPI:  Patient is a 89 yo F with PMHx of dementia, hypothyroidism, HLD, hypercoagulable state, ?movement disorder, chronic left shoulder dislocation who presented to ED today after having fall in bathroom. Pt states she was sitting and then fell over and landing on her front. She denies any injury. She remembers the incident and denies any prodromal symptoms including chest pain, palpitations, SOB, diaphoresis. She denies any URI infection symptoms, fevers, chills, abd pain complaints or urinary complaints. No rashes. She denies any neurologic symptoms    She only takes synthroid at home. Has stopped all other meds on her own bc of side effects she states. She was told she has a movement disorder but has not been taking anything for it (outpt med rec shows old script of Sinemet). She denies having CVA in past. She usually ambulates with a walker. She has someone come once a week to clean her apartment but other wise she manages her own affairs including cooking. She lives in an elevator building. (25 Mar 2017 23:36)      PAST MEDICAL & SURGICAL HISTORY:  Dementia  CVA (cerebral vascular accident)  Movement disorder  Hypertension  Hypothyroidism      MEDICATIONS  (STANDING):  heparin  Injectable 5000Unit(s) SubCutaneous every 12 hours  levothyroxine 50MICROGram(s) Oral daily  amLODIPine   Tablet 5milliGRAM(s) Oral daily  sodium chloride 0.9%. 1000milliLiter(s) IV Continuous <Continuous>    MEDICATIONS  (PRN):      Social History: , lives alone in an elevator accessible apartment building, has  once a week, no home care services    Functional Level Prior to Admission: reports ADL independent, walks with a rolling walker, "doesn't go out much"    FAMILY HISTORY:      CBC Full  -  ( 26 Mar 2017 06:41 )  WBC Count : 15.5 K/uL  Hemoglobin : 11.7 g/dL  Hematocrit : 35.9 %  Platelet Count - Automated : 247 K/uL  Mean Cell Volume : 92.1 fL  Mean Cell Hemoglobin : 30.0 pg  Mean Cell Hemoglobin Concentration : 32.6 g/dL  Auto Neutrophil # : x  Auto Lymphocyte # : x  Auto Monocyte # : x  Auto Eosinophil # : x  Auto Basophil # : x  Auto Neutrophil % : x  Auto Lymphocyte % : x  Auto Monocyte % : x  Auto Eosinophil % : x  Auto Basophil % : x      26 Mar 2017 06:41    142    |  113    |  51     ----------------------------<  75     5.1     |  19     |  1.40     Ca    8.3        26 Mar 2017 06:41  Mg     1.9       26 Mar 2017 06:41    TPro  6.9    /  Alb  2.9    /  TBili  0.7    /  DBili  x      /  AST  12     /  ALT  13     /  AlkPhos  100    26 Mar 2017 06:41        Radiology:    EXAM:  CT BRAIN                          PROCEDURE DATE:  03/25/2017                     INTERPRETATION:  CT OF THE HEAD WITHOUT CONTRAST    INDICATION:  fall    TECHNIQUE: An axial noncontrast CT scan of the head was performed.   Sagittal and coronal reformatted images were also obtained.    CONTRAST:  None    COMPARISON:  9/2/2012    FINDINGS:  There is no hydrocephalus. The basal cisterns are patent. There is no   focal mass effect or midline shift. There are no extra-axial collections   or intraparenchymal hemorrhage.    There are scattered regions of hypodensity involving the periventricular   and subcortical white matter consistent with chronic microvascular   ischemic changes. Chronic right thalamic lacunar infarct is again noted   is. There is no evidence of acute transcortical territorial infarction.    The globes and retrobulbar fat are intact.    The mastoids and paranasal sinuses are well aerated. The calvaria is   intact. Impacted cerumen within the right external auditory canal    IMPRESSION:  No hydrocephalus, midline shift, acute intracranial hemorrhage or   demarcated territorial infarct.  Chronic white matter microangiopathic ischemic disease. Chronic right   thalamic lacunar infarct, also noted on the prior study.    EXAM:  XR SHOULDER-LEFT MIN 2 VIEWS                          EXAM:  XR CHEST-FRONTAL                          PROCEDURE DATE:  03/25/2017             INTERPRETATION:  CLINICAL INDICATION: 89-year-old with shoulder pain.      FINDINGS: Frontal view of the chest is compared to 10/10/2011 and   demonstrates midline trachea. Normal heart size. No consolidation. No   pleural effusion. Marked multilevel degenerative change of the   thoracolumbar spine.    Internal rotation, external rotation and scapular Y views of the left   shoulder demonstrate severe degenerative change of the glenohumeral   joint. There is marked elevation of the left femoral head consistent with   extensive rotator cuff tearing. Visualized left lung is clear. No   fracture or dislocation. Moderate hypertrophic arthropathy of the   acromioclavicular joint.      Vital Signs Last 24 Hrs  T(C): 36.4, Max: 36.8 (03-26 @ 21:25)  T(F): 97.6, Max: 98.3 (03-26 @ 21:25)  HR: 68 (68 - 86)  BP: 195/75 (134/61 - 195/75)  BP(mean): --  RR: 16 (16 - 20)  SpO2: 99% (97% - 99%)    REVIEW OF SYSTEMS:    CONSTITUTIONAL:  fatigue  EYES: No eye pain, visual disturbances, or discharge  ENMT:  No difficulty hearing, tinnitus, vertigo; No sinus or throat pain  NECK: No pain or stiffness  BREASTS: No pain, masses, or nipple discharge  RESPIRATORY: No cough, wheezing, chills or hemoptysis; No shortness of breath  CARDIOVASCULAR: No chest pain, palpitations, dizziness, or leg swelling  GASTROINTESTINAL: No abdominal or epigastric pain. No nausea, vomiting, or hematemesis; No diarrhea or constipation. No melena or hematochezia.  GENITOURINARY: No dysuria, frequency, hematuria, or incontinence  NEUROLOGICAL: No headaches, memory loss, loss of strength, numbness, or tremors  SKIN: No itching, burning, rashes, or lesions   LYMPH NODES: No enlarged glands  ENDOCRINE: No heat or cold intolerance; No hair loss  MUSCULOSKELETAL: left shoulder pain  PSYCHIATRIC: No depression, anxiety, mood swings, or difficulty sleeping  HEME/LYMPH: No easy bruising, or bleeding gums  ALLERGY AND IMMUNOLOGIC: No hives or eczema      Physical Exam: frail appearing  woman lying in bed, pleasant, c/o left shoulder pain    HEENT: normocephalic/ atraumatic, anicteric    Neck: supple, negative JVD, negative carotid bruits,    Chest: CTA bilaterally, neg wheeze, rhonchi, rales, crackles, egophany    Cardiovascular: regular rate and rhythm, neg murmurs/rubs/gallops    Abdomen: soft, non tender, negative rebound/guarding, non distended, normal bowel sounds, neg hepatosplenomegaly    Extremities: WWP, neg cyanosis/clubbing/edema, negative calf tenderness to palpation, negative Margaret's sign, bilateral hand intentional tremors    Left shoulder: neg ecchymosis, decreased range of motion flexion 0-75 deg, abduction 0-80 degress with marked crepitus, supraspinatus 3+/5    Neurologic Exam:    Alert and oriented x 2 to person, place, 2010, speech fluent w/o dysarthria, repetition intact, comprehension intact, follows commands    Cranial Nerves:     II:                       pupils equal, round and reactive to light, visual fields intact   III/ IV/VI:             extraocular movements intact, neg nystagmus, ptosis  V:                      facial sensation intact, V1-3 normal  VII:                     face symmetric, no droop, normal eye closure and smile  VIII:                    hearing intact to finger rub bilaterally  IX/ X:                  soft palate rise symmetrical  XI:                      head turning, shoulder shrug normal  XII:                     tongue midline    Motor Exam:    Bilateral UE:        4/5 /intrinsics                            4+/5 biceps/triceps/wrist extensors-flexors/deltoid, Left supraspinatus 3+/5                            negative pronator drift      Bilateral LE:        4+/5 hip flexors/adductors/abductors                            4+/5 quadriceps/hamstrings                            5/5 dorsiflexors/plantar flexors/invertors-evertors    Sensory: intact to LT/PP in all UE/LE dermatomes    DTR:      =  biceps/     triceps/     brachioradialis               =  patella/   medial hamstring/    ankle                 neg clonus                 neg Babinski                 neg Hoffmans      Gait:  NT        PM&R Impression:    1) s/p fall  2) OA left shoulder with probable rotator cuff tear  3) deconditioned  4) gait dysfunction      Recommendations:    1) Physical therapy focusing on therapeutic exercises, bed mobility/transfer out of bed evaluation, progressive ambulation with assistive devices.    2) Disposition Plan: recommend subacute rehab placement Patient is a 89y old  Female who presents with a chief complaint of fall (25 Mar 2017 23:36)      HPI:  Patient is a 89 yo F with PMHx of dementia, hypothyroidism, HLD, hypercoagulable state, ?movement disorder, chronic left shoulder dislocation who presented to ED today after having fall in bathroom. Pt states she was sitting and then fell over and landing on her front. She denies any injury. She remembers the incident and denies any prodromal symptoms including chest pain, palpitations, SOB, diaphoresis. She denies any URI infection symptoms, fevers, chills, abd pain complaints or urinary complaints. No rashes. She denies any neurologic symptoms    She only takes synthroid at home. Has stopped all other meds on her own bc of side effects she states. She was told she has a movement disorder but has not been taking anything for it (outpt med rec shows old script of Sinemet). She denies having CVA in past. She usually ambulates with a walker. She has someone come once a week to clean her apartment but other wise she manages her own affairs including cooking. She lives in an elevator building. (25 Mar 2017 23:36)      PAST MEDICAL & SURGICAL HISTORY:  Dementia  CVA (cerebral vascular accident)  Movement disorder  Hypertension  Hypothyroidism      MEDICATIONS  (STANDING):  heparin  Injectable 5000Unit(s) SubCutaneous every 12 hours  levothyroxine 50MICROGram(s) Oral daily  amLODIPine   Tablet 5milliGRAM(s) Oral daily  sodium chloride 0.9%. 1000milliLiter(s) IV Continuous <Continuous>    MEDICATIONS  (PRN):      Social History: , lives alone in an elevator accessible apartment building, has  once a week, no home care services    Functional Level Prior to Admission: reports ADL independent, walks with a rolling walker, "doesn't go out much"    FAMILY HISTORY:      CBC Full  -  ( 26 Mar 2017 06:41 )  WBC Count : 15.5 K/uL  Hemoglobin : 11.7 g/dL  Hematocrit : 35.9 %  Platelet Count - Automated : 247 K/uL  Mean Cell Volume : 92.1 fL  Mean Cell Hemoglobin : 30.0 pg  Mean Cell Hemoglobin Concentration : 32.6 g/dL  Auto Neutrophil # : x  Auto Lymphocyte # : x  Auto Monocyte # : x  Auto Eosinophil # : x  Auto Basophil # : x  Auto Neutrophil % : x  Auto Lymphocyte % : x  Auto Monocyte % : x  Auto Eosinophil % : x  Auto Basophil % : x      26 Mar 2017 06:41    142    |  113    |  51     ----------------------------<  75     5.1     |  19     |  1.40     Ca    8.3        26 Mar 2017 06:41  Mg     1.9       26 Mar 2017 06:41    TPro  6.9    /  Alb  2.9    /  TBili  0.7    /  DBili  x      /  AST  12     /  ALT  13     /  AlkPhos  100    26 Mar 2017 06:41        Radiology:    EXAM:  CT BRAIN                          PROCEDURE DATE:  03/25/2017                     INTERPRETATION:  CT OF THE HEAD WITHOUT CONTRAST    INDICATION:  fall    TECHNIQUE: An axial noncontrast CT scan of the head was performed.   Sagittal and coronal reformatted images were also obtained.    CONTRAST:  None    COMPARISON:  9/2/2012    FINDINGS:  There is no hydrocephalus. The basal cisterns are patent. There is no   focal mass effect or midline shift. There are no extra-axial collections   or intraparenchymal hemorrhage.    There are scattered regions of hypodensity involving the periventricular   and subcortical white matter consistent with chronic microvascular   ischemic changes. Chronic right thalamic lacunar infarct is again noted   is. There is no evidence of acute transcortical territorial infarction.    The globes and retrobulbar fat are intact.    The mastoids and paranasal sinuses are well aerated. The calvaria is   intact. Impacted cerumen within the right external auditory canal    IMPRESSION:  No hydrocephalus, midline shift, acute intracranial hemorrhage or   demarcated territorial infarct.  Chronic white matter microangiopathic ischemic disease. Chronic right   thalamic lacunar infarct, also noted on the prior study.    EXAM:  XR SHOULDER-LEFT MIN 2 VIEWS                          EXAM:  XR CHEST-FRONTAL                          PROCEDURE DATE:  03/25/2017             INTERPRETATION:  CLINICAL INDICATION: 89-year-old with shoulder pain.      FINDINGS: Frontal view of the chest is compared to 10/10/2011 and   demonstrates midline trachea. Normal heart size. No consolidation. No   pleural effusion. Marked multilevel degenerative change of the   thoracolumbar spine.    Internal rotation, external rotation and scapular Y views of the left   shoulder demonstrate severe degenerative change of the glenohumeral   joint. There is marked elevation of the left femoral head consistent with   extensive rotator cuff tearing. Visualized left lung is clear. No   fracture or dislocation. Moderate hypertrophic arthropathy of the   acromioclavicular joint.      Vital Signs Last 24 Hrs  T(C): 36.4, Max: 36.8 (03-26 @ 21:25)  T(F): 97.6, Max: 98.3 (03-26 @ 21:25)  HR: 68 (68 - 86)  BP: 195/75 (134/61 - 195/75)  BP(mean): --  RR: 16 (16 - 20)  SpO2: 99% (97% - 99%)    REVIEW OF SYSTEMS:    CONSTITUTIONAL:  fatigue  EYES: No eye pain, visual disturbances, or discharge  ENMT:  No difficulty hearing, tinnitus, vertigo; No sinus or throat pain  NECK: No pain or stiffness  BREASTS: No pain, masses, or nipple discharge  RESPIRATORY: No cough, wheezing, chills or hemoptysis; No shortness of breath  CARDIOVASCULAR: No chest pain, palpitations, dizziness, or leg swelling  GASTROINTESTINAL: No abdominal or epigastric pain. No nausea, vomiting, or hematemesis; No diarrhea or constipation. No melena or hematochezia.  GENITOURINARY: No dysuria, frequency, hematuria, or incontinence  NEUROLOGICAL: No headaches, memory loss, loss of strength, numbness, or tremors  SKIN: No itching, burning, rashes, or lesions   LYMPH NODES: No enlarged glands  ENDOCRINE: No heat or cold intolerance; No hair loss  MUSCULOSKELETAL: left shoulder pain  PSYCHIATRIC: No depression, anxiety, mood swings, or difficulty sleeping  HEME/LYMPH: No easy bruising, or bleeding gums  ALLERGY AND IMMUNOLOGIC: No hives or eczema      Physical Exam: frail appearing  woman lying in bed, pleasant, c/o left shoulder pain    HEENT: normocephalic/ atraumatic, anicteric    Neck: supple, negative JVD, negative carotid bruits,    Chest: CTA bilaterally, neg wheeze, rhonchi, rales, crackles, egophany    Cardiovascular: regular rate and rhythm, II/VI systolic murmur    Abdomen: soft, non tender, negative rebound/guarding, non distended, normal bowel sounds, neg hepatosplenomegaly    Extremities: WWP, neg cyanosis/clubbing/edema, negative calf tenderness to palpation, negative Margaret's sign, bilateral hand intentional tremors    Left shoulder: neg ecchymosis, decreased range of motion flexion 0-75 deg, abduction 0-80 degress with marked crepitus, supraspinatus 3+/5    Neurologic Exam:    Alert and oriented x 2 to person, place, 2010, speech fluent w/o dysarthria, repetition intact, comprehension intact, follows commands    Cranial Nerves:     II:                       pupils equal, round and reactive to light, visual fields intact   III/ IV/VI:             extraocular movements intact, neg nystagmus, ptosis  V:                      facial sensation intact, V1-3 normal  VII:                     face symmetric, no droop, normal eye closure and smile  VIII:                    hearing intact to finger rub bilaterally  IX/ X:                  soft palate rise symmetrical  XI:                      head turning, shoulder shrug normal  XII:                     tongue midline    Motor Exam:    Bilateral UE:        4/5 /intrinsics                            4+/5 biceps/triceps/wrist extensors-flexors/deltoid, Left supraspinatus 3+/5                            negative pronator drift      Bilateral LE:        4+/5 hip flexors/adductors/abductors                            4+/5 quadriceps/hamstrings                            5/5 dorsiflexors/plantar flexors/invertors-evertors    Sensory: intact to LT/PP in all UE/LE dermatomes    DTR:      =  biceps/     triceps/     brachioradialis               =  patella/   medial hamstring/    ankle                 neg clonus                 neg Babinski                 neg Hoffmans      Gait:  NT        PM&R Impression:    1) s/p fall  2) OA left shoulder with probable rotator cuff tear  3) deconditioned  4) gait dysfunction      Recommendations:    1) Physical therapy focusing on therapeutic exercises, bed mobility/transfer out of bed evaluation, progressive ambulation with assistive devices.    2) Disposition Plan: recommend subacute rehab placement

## 2017-03-27 NOTE — PROGRESS NOTE ADULT - PROBLEM SELECTOR PLAN 2
Unclear baseline, possibly CKD. No urinary frequency/incontinence/dysuria.  - Will check FENa  - Will get collateral from Dr. Fuller baseline labs  - renally adjust all meds and avoid nephrotoxic agents Unclear baseline, possibly CKD. No urinary frequency/incontinence/dysuria. Cr 1.47 on admission, now down to 1.20.  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 Fuller baseline labs  - renally adjust all meds and avoid nephrotoxic agents
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
## 2017-03-27 NOTE — PROGRESS NOTE ADULT - SUBJECTIVE AND OBJECTIVE BOX
PGY-1 Medicine Progress Note    Overnight events: No acute events, however hypertensive to 195/75 (came down to 138/59 after receiving AM amlodipine and d/c fludis), and asymptomatic.    Subjective: Pt seen and examined at bedside this morning. Pt c/o 1-2 episodes of watery brown nonbloody diarrhea, not foul smelling, described as "a lot" each time. Overnight RN aware that pt had a BM but not of appearance - day RN will monitor. Otherwise, pt denies fever/cough/sob/CP, dysuria/urinary frequency/incontinence (goes in bed pan), and denies HA/dizziness/blurry vision. Rest of ROS negative.    Vital Signs Last 24 Hrs  T(C): 36.3, Max: 36.8 (03-26 @ 21:25)  T(F): 97.4, Max: 98.3 (03-26 @ 21:25)  HR: 78 (68 - 86)  BP: 138/59 (138/59 - 195/75)  BP(mean): --  RR: 16 (16 - 20)  SpO2: 96% (96% - 99%)    Daily     Daily   I&O's Summary  I & Os for 24h ending 27 Mar 2017 07:00  =============================================  IN: 1440 ml / OUT: 0 ml / NET: 1440 ml    I & Os for current day (as of 27 Mar 2017 10:50)  =============================================  IN: 80 ml / OUT: 0 ml / NET: 80 ml    EXAM:  GEN: Alert, NAD, comfortable  HEENT: no LAD, moist MM, no pharyngeal erythema or exudate, PERRL/EOMI  CV: RRR, S1/S2, no murmurs appreciated  RESP: fine b/l crackles up to mid lung (likely 2/2 IV fluids), good respiratory effort, no wheezes/rales  ABD: BS+, NTND, no guarding/rebound  EXTREMITIES: WWP, pulses 2+ in all 4 extremities, no peripheral edema  NEURO: A&Ox2-3 (thinks it is 1917), left arm with cogwheel rigidity, no sensory defect    MEDICATIONS  (STANDING):  heparin  Injectable 5000Unit(s) SubCutaneous every 12 hours  levothyroxine 50MICROGram(s) Oral daily  amLODIPine   Tablet 5milliGRAM(s) Oral daily    MEDICATIONS  (PRN):    Allergies    No Known Allergies    Intolerances    Labs: reviewed.                          11.7   15.5  )-----------( 247      ( 26 Mar 2017 06:41 )             35.9     26 Mar 2017 06:41    142    |  113    |  51     ----------------------------<  75     5.1     |  19     |  1.40     Ca    8.3        26 Mar 2017 06:41  Mg     1.9       26 Mar 2017 06:41    TPro  6.9    /  Alb  2.9    /  TBili  0.7    /  DBili  x      /  AST  12     /  ALT  13     /  AlkPhos  100    26 Mar 2017 06:41    PT/INR - ( 25 Mar 2017 20:44 )   PT: 12.0 sec;   INR: 1.08          PTT - ( 25 Mar 2017 20:44 )  PTT:33.2 sec      Radiology and additional tests: reviewed. PGY-1 Medicine Progress Note    Overnight events: No acute events, however hypertensive to 195/75 (came down to 138/59 after receiving AM amlodipine and d/c fludis), and asymptomatic.    Subjective: Pt seen and examined at bedside this morning. Pt c/o 1-2 episodes of watery brown nonbloody diarrhea, not foul smelling, described as "a lot" each time. Overnight RN aware that pt had a BM but not of appearance - day RN will monitor. **Per day RN, pt not having diarrhea - normal soft brown stool. Otherwise, pt denies fever/cough/sob/CP, dysuria/urinary frequency/incontinence (goes in bed pan), and denies HA/dizziness/blurry vision. Rest of ROS negative.    Vital Signs Last 24 Hrs  T(C): 36.3, Max: 36.8 (03-26 @ 21:25)  T(F): 97.4, Max: 98.3 (03-26 @ 21:25)  HR: 78 (68 - 86)  BP: 138/59 (138/59 - 195/75)  BP(mean): --  RR: 16 (16 - 20)  SpO2: 96% (96% - 99%)    Daily     Daily   I&O's Summary  I & Os for 24h ending 27 Mar 2017 07:00  =============================================  IN: 1440 ml / OUT: 0 ml / NET: 1440 ml    I & Os for current day (as of 27 Mar 2017 10:50)  =============================================  IN: 80 ml / OUT: 0 ml / NET: 80 ml    EXAM:  GEN: Alert, NAD, comfortable  HEENT: no LAD, moist MM, no pharyngeal erythema or exudate, PERRL/EOMI  CV: RRR, S1/S2, no murmurs appreciated  RESP: fine b/l crackles up to mid lung (likely 2/2 IV fluids), good respiratory effort, no wheezes/rales  ABD: BS+, NTND, no guarding/rebound  EXTREMITIES: WWP, pulses 2+ in all 4 extremities, no peripheral edema  NEURO: A&Ox2-3 (thinks it is 1917), left arm with cogwheel rigidity, no sensory defect    MEDICATIONS  (STANDING):  heparin  Injectable 5000Unit(s) SubCutaneous every 12 hours  levothyroxine 50MICROGram(s) Oral daily  amLODIPine   Tablet 5milliGRAM(s) Oral daily    MEDICATIONS  (PRN):    Allergies    No Known Allergies    Intolerances    Labs: reviewed.                          11.7   15.5  )-----------( 247      ( 26 Mar 2017 06:41 )             35.9     26 Mar 2017 06:41    142    |  113    |  51     ----------------------------<  75     5.1     |  19     |  1.40     Ca    8.3        26 Mar 2017 06:41  Mg     1.9       26 Mar 2017 06:41    TPro  6.9    /  Alb  2.9    /  TBili  0.7    /  DBili  x      /  AST  12     /  ALT  13     /  AlkPhos  100    26 Mar 2017 06:41    PT/INR - ( 25 Mar 2017 20:44 )   PT: 12.0 sec;   INR: 1.08          PTT - ( 25 Mar 2017 20:44 )  PTT:33.2 sec      Radiology and additional tests: reviewed.

## 2017-03-27 NOTE — CONSULT NOTE ADULT - SUBJECTIVE AND OBJECTIVE BOX
Clinical per Team:  Ms. Ruffin is a 89 yo F who presented to ED on 3/25 after having fall in bathroom. Pt states she was sitting and then fell over and landing on her front. Per provider documentation, she denies any injury. She has been assessed by the multidisciplinary team and is now cleared by providers for discharge.    Ethics Question:  Is it in the patient's best interest to go home with aides rather than a SNF if she has resources to care for herself at home?    Ethics Process:  I have reviewed the record, spoken with the primary med team, met with the patient, first on my own and then with ASHLEE Gonzalez and a visitor from a senior visit outreach program who knows the patient in the community.      Ethics Findings:   The patient is jovial and talks of her younger days in [place], and recounts how much she loves [place] as well.  She noted that she is "almost 89 years old.  It is time to leave the world to the young people with babies."  The patient noted that she does have some financial resources.  She is very much looking forward to going to a nursing home, "if it is a good one."  She prefers a SNF over homecare noting that she is "all alone at home and (I) like to be with people."  She is genuinely excited about the opportunity to go to [facility] [place].  In addition, upon noting her prior statement about her time in life, discussed advance directives along with ASHLEE Gonzalez. Patient is capable of participating in health care decisions.  She demonstrates good understanding of the notions of CPR, intubation and the like.  She is very clear that she wants none of that, preferring a peaceful death when that time comes.  She will accept conservative treatment if it maintains her quality of life.      Recommendations:  D/C to SNF [facility] per patient preference.  MOLST document to go along with the patient to represent her wishes for EOL care.  Team and SW aware.    Thank you for consulting Ethics.

## 2017-03-27 NOTE — PROGRESS NOTE ADULT - PROBLEM SELECTOR PLAN 6
TSH wnl.  - Continue Synthroid 50mcg daily for now pending verification w/ Dr. Fuller TSH wnl. No sx of clinical hypothyroidism.  - Continue Synthroid 50mcg daily for now - dose is correct per Dr. Fuller

## 2017-03-27 NOTE — CHART NOTE - NSCHARTNOTEFT_GEN_A_CORE
Had goals of care conversation with patient, along with Dr. Jose Murphy. Patient voiced her wishes that she wants to be DNR and DNI. She does not want any life saving procedures done to prolong her life. We discussed that she will still receive medical care if needed, but only have her wishes regarding if she goes into cardiac arrest or would be in respiratory distress, requiring intubation. She is in agreement. MOLST filled out and [facility] DNR/DNI filled out and placed in front of chart. DNR/DNI order entered. Discussed with Dr. Fuller.

## 2017-03-27 NOTE — PROGRESS NOTE ADULT - PROBLEM SELECTOR PLAN 3
No clear source of infection. Elevated WBC with lymphocytic predominance.  - check UA  - monitor clinical status for signs of SIRS/sepsis No clear source of infection. Elevated WBC with lymphocytic predominance. XR chest clear.  - check UA  - monitor clinical status for signs of SIRS/sepsis No clear source of infection. Elevated WBC with lymphocytic predominance. XR chest clear. Possibly diarrhea per pt complaint, however BM witnessed by RN during the day not diarrhea.  - check UA  - monitor clinical status for signs of SIRS/sepsis No clear source of infection. Elevated WBC with lymphocytic predominance. XR chest clear. Possibly diarrhea per pt complaint, however BM witnessed by RN during the day not diarrhea. UA negative (bact present, WBC < 5)  - monitor clinical status for signs of SIRS/sepsis  - smudge cells seen on smear, consider malignancy as part of differential (most patients presenting w/ CLL are asymptomatic)

## 2017-03-27 NOTE — PROGRESS NOTE ADULT - PROBLEM SELECTOR PLAN 5
-old CVA found on CT  -patient would benefit from aspirin and possibly statin however given's patient age and non-compliance pt may not be ideal candidate for starting new medications  -will check lipid panel

## 2017-03-27 NOTE — PROGRESS NOTE ADULT - PROBLEM SELECTOR PLAN 7
- regular diet w/ 2 health shake cans/day  - no longer on IV fluids  - replete electrolytes PRN, K > 4, Mg > 2

## 2017-03-28 ENCOUNTER — TRANSCRIPTION ENCOUNTER (OUTPATIENT)
Age: 82
End: 2017-03-28

## 2017-03-28 VITALS
DIASTOLIC BLOOD PRESSURE: 91 MMHG | OXYGEN SATURATION: 98 % | SYSTOLIC BLOOD PRESSURE: 143 MMHG | HEART RATE: 60 BPM | TEMPERATURE: 96 F | RESPIRATION RATE: 18 BRPM

## 2017-03-28 DIAGNOSIS — H40.9 UNSPECIFIED GLAUCOMA: ICD-10-CM

## 2017-03-28 LAB
ANION GAP SERPL CALC-SCNC: 5 MMOL/L — LOW (ref 9–16)
BUN SERPL-MCNC: 38 MG/DL — HIGH (ref 7–23)
CALCIUM SERPL-MCNC: 8.5 MG/DL — SIGNIFICANT CHANGE UP (ref 8.5–10.5)
CHLORIDE SERPL-SCNC: 113 MMOL/L — HIGH (ref 96–108)
CO2 SERPL-SCNC: 20 MMOL/L — LOW (ref 22–31)
CREAT SERPL-MCNC: 1.25 MG/DL — SIGNIFICANT CHANGE UP (ref 0.5–1.3)
FERRITIN SERPL-MCNC: 40.5 NG/ML — SIGNIFICANT CHANGE UP (ref 8–252)
FOLATE SERPL-MCNC: 10.5 NG/ML — SIGNIFICANT CHANGE UP (ref 4.8–24.2)
GLUCOSE SERPL-MCNC: 94 MG/DL — SIGNIFICANT CHANGE UP (ref 70–99)
HCT VFR BLD CALC: 36.6 % — SIGNIFICANT CHANGE UP (ref 34.5–45)
HCYS SERPL-MCNC: 17.3 UMOL/L — SIGNIFICANT CHANGE UP (ref 5–20)
HGB BLD-MCNC: 12.3 G/DL — SIGNIFICANT CHANGE UP (ref 11.5–15.5)
IRON SATN MFR SERPL: 35 % — SIGNIFICANT CHANGE UP (ref 20–38)
IRON SATN MFR SERPL: 70 UG/DL — SIGNIFICANT CHANGE UP (ref 50–170)
MAGNESIUM SERPL-MCNC: 2 MG/DL — SIGNIFICANT CHANGE UP (ref 1.6–2.4)
MCHC RBC-ENTMCNC: 30.8 PG — SIGNIFICANT CHANGE UP (ref 27–34)
MCHC RBC-ENTMCNC: 33.6 G/DL — SIGNIFICANT CHANGE UP (ref 32–36)
MCV RBC AUTO: 91.7 FL — SIGNIFICANT CHANGE UP (ref 80–100)
PLATELET # BLD AUTO: 233 K/UL — SIGNIFICANT CHANGE UP (ref 150–400)
POTASSIUM SERPL-MCNC: 5.7 MMOL/L — HIGH (ref 3.5–5.3)
POTASSIUM SERPL-SCNC: 5.7 MMOL/L — HIGH (ref 3.5–5.3)
RBC # BLD: 3.99 M/UL — SIGNIFICANT CHANGE UP (ref 3.8–5.2)
RBC # BLD: 3.99 M/UL — SIGNIFICANT CHANGE UP (ref 3.8–5.2)
RBC # FLD: 14.7 % — SIGNIFICANT CHANGE UP (ref 10.3–16.9)
RETICS/RBC NFR: 1.6 % — SIGNIFICANT CHANGE UP (ref 0.5–2.5)
SODIUM SERPL-SCNC: 138 MMOL/L — SIGNIFICANT CHANGE UP (ref 135–145)
TIBC SERPL-MCNC: 202 UG/DL — LOW (ref 250–450)
VIT B12 SERPL-MCNC: 294 PG/ML — SIGNIFICANT CHANGE UP (ref 243–894)
WBC # BLD: 15.8 K/UL — HIGH (ref 3.8–10.5)
WBC # FLD AUTO: 15.8 K/UL — HIGH (ref 3.8–10.5)

## 2017-03-28 PROCEDURE — 86850 RBC ANTIBODY SCREEN: CPT

## 2017-03-28 PROCEDURE — 96374 THER/PROPH/DIAG INJ IV PUSH: CPT

## 2017-03-28 PROCEDURE — 80048 BASIC METABOLIC PNL TOTAL CA: CPT

## 2017-03-28 PROCEDURE — 84165 PROTEIN E-PHORESIS SERUM: CPT

## 2017-03-28 PROCEDURE — 82746 ASSAY OF FOLIC ACID SERUM: CPT

## 2017-03-28 PROCEDURE — 85610 PROTHROMBIN TIME: CPT

## 2017-03-28 PROCEDURE — 82550 ASSAY OF CK (CPK): CPT

## 2017-03-28 PROCEDURE — 85025 COMPLETE CBC W/AUTO DIFF WBC: CPT

## 2017-03-28 PROCEDURE — 70450 CT HEAD/BRAIN W/O DYE: CPT

## 2017-03-28 PROCEDURE — 85045 AUTOMATED RETICULOCYTE COUNT: CPT

## 2017-03-28 PROCEDURE — 81003 URINALYSIS AUTO W/O SCOPE: CPT

## 2017-03-28 PROCEDURE — 85730 THROMBOPLASTIN TIME PARTIAL: CPT

## 2017-03-28 PROCEDURE — 71045 X-RAY EXAM CHEST 1 VIEW: CPT

## 2017-03-28 PROCEDURE — 85027 COMPLETE CBC AUTOMATED: CPT

## 2017-03-28 PROCEDURE — 81001 URINALYSIS AUTO W/SCOPE: CPT

## 2017-03-28 PROCEDURE — 82607 VITAMIN B-12: CPT

## 2017-03-28 PROCEDURE — 99285 EMERGENCY DEPT VISIT HI MDM: CPT | Mod: 25

## 2017-03-28 PROCEDURE — 80061 LIPID PANEL: CPT

## 2017-03-28 PROCEDURE — 36415 COLL VENOUS BLD VENIPUNCTURE: CPT

## 2017-03-28 PROCEDURE — 83735 ASSAY OF MAGNESIUM: CPT

## 2017-03-28 PROCEDURE — 86900 BLOOD TYPING SEROLOGIC ABO: CPT

## 2017-03-28 PROCEDURE — 84484 ASSAY OF TROPONIN QUANT: CPT

## 2017-03-28 PROCEDURE — 80053 COMPREHEN METABOLIC PANEL: CPT

## 2017-03-28 PROCEDURE — 83550 IRON BINDING TEST: CPT

## 2017-03-28 PROCEDURE — 82728 ASSAY OF FERRITIN: CPT

## 2017-03-28 PROCEDURE — 86901 BLOOD TYPING SEROLOGIC RH(D): CPT

## 2017-03-28 PROCEDURE — 82553 CREATINE MB FRACTION: CPT

## 2017-03-28 PROCEDURE — 82570 ASSAY OF URINE CREATININE: CPT

## 2017-03-28 PROCEDURE — 97162 PT EVAL MOD COMPLEX 30 MIN: CPT

## 2017-03-28 PROCEDURE — 84443 ASSAY THYROID STIM HORMONE: CPT

## 2017-03-28 PROCEDURE — 93306 TTE W/DOPPLER COMPLETE: CPT

## 2017-03-28 PROCEDURE — 83090 ASSAY OF HOMOCYSTEINE: CPT

## 2017-03-28 PROCEDURE — 73562 X-RAY EXAM OF KNEE 3: CPT

## 2017-03-28 PROCEDURE — 93306 TTE W/DOPPLER COMPLETE: CPT | Mod: 26

## 2017-03-28 PROCEDURE — 93005 ELECTROCARDIOGRAM TRACING: CPT

## 2017-03-28 PROCEDURE — 73030 X-RAY EXAM OF SHOULDER: CPT

## 2017-03-28 PROCEDURE — 84300 ASSAY OF URINE SODIUM: CPT

## 2017-03-28 RX ORDER — ACETAMINOPHEN 500 MG
2 TABLET ORAL
Qty: 0 | Refills: 0 | COMMUNITY
Start: 2017-03-28

## 2017-03-28 RX ORDER — LATANOPROST 0.05 MG/ML
1 SOLUTION/ DROPS OPHTHALMIC; TOPICAL AT BEDTIME
Qty: 0 | Refills: 0 | Status: DISCONTINUED | OUTPATIENT
Start: 2017-03-28 | End: 2017-03-28

## 2017-03-28 RX ORDER — AMLODIPINE BESYLATE 2.5 MG/1
1 TABLET ORAL
Qty: 0 | Refills: 0 | COMMUNITY
Start: 2017-03-28

## 2017-03-28 RX ORDER — LATANOPROST 0.05 MG/ML
1 SOLUTION/ DROPS OPHTHALMIC; TOPICAL
Qty: 0 | Refills: 0 | COMMUNITY
Start: 2017-03-28

## 2017-03-28 RX ADMIN — HEPARIN SODIUM 5000 UNIT(S): 5000 INJECTION INTRAVENOUS; SUBCUTANEOUS at 05:46

## 2017-03-28 RX ADMIN — AMLODIPINE BESYLATE 5 MILLIGRAM(S): 2.5 TABLET ORAL at 05:46

## 2017-03-28 RX ADMIN — Medication 50 MICROGRAM(S): at 05:46

## 2017-03-28 NOTE — DISCHARGE NOTE ADULT - PATIENT PORTAL LINK FT
“You can access the Follow[facility] Patient Portal, offered by [facility], by registering with the following website: [email or web address]”

## 2017-03-28 NOTE — PROGRESS NOTE ADULT - ASSESSMENT
89 yo F with PMHx of dementia, hypothyroidism, HLD, HTN, hypercoagulable state, ?movement disorder, chronic left shoulder dislocation, non-compliant with home meds (only takes Synthroid), glaucoma, presented s/p fall at home (unclear etiology, likely mechanical vs infection though no source identified - isolated leukocytosis), c/c/b asymptomatic HTN urgency, currently HD stable.
89 yo F with PMHx of dementia, hypothyroidism, HLD, HTN, hypercoagulable state, ?movement disorder, chronic left shoulder dislocation, non-compliant with home meds (only takes Synthroid), presented s/p fall at home (unclear etiology, likely mechanical vs infection though no source identified - isolated leukocytosis), c/c/b asymptomatic HTN urgency, currently HD stable.
creat back to baseline,  PT and short term rehab, social work
discharge planning  echo ordered but not done

## 2017-03-28 NOTE — DISCHARGE NOTE ADULT - CARE PROVIDER_API CALL
Delvis Fuller), Medicine  [address]  [place] [address]  Phone: [phone]  Fax: [phone]    Roxann Snider), Medicine  [address]rd [place] [address]  Phone: [phone]  Fax: [phone]    Isac Miller), Orthopaedic Surgery  [address] [address]  Phone: [phone]  Fax: [phone]

## 2017-03-28 NOTE — PROGRESS NOTE ADULT - PROBLEM SELECTOR PLAN 3
No clear source of infection, however CLL/heme malignancy on the differential due to smudge cells on smear, diffuse lymphadenopathy on anterior cervical, supraclavicular, and axillary areas. XR chest clear. UA negative (bact present, WBC < 5)  - monitor clinical status for signs of SIRS/sepsis  - smudge cells seen on smear, consider malignancy as part of differential (most patients presenting w/ CLL are asymptomatic) No clear source of infection, however CLL/heme malignancy on the differential due to smudge cells on smear, diffuse lymphadenopathy on anterior cervical, supraclavicular, and axillary areas. XR chest clear. UA negative (bact present, WBC < 5)  - smudge cells seen on smear, consider malignancy as part of differential (most patients presenting w/ CLL are asymptomatic) - heme w/up with Dr. Snider as outpatient

## 2017-03-28 NOTE — PROGRESS NOTE ADULT - PROBLEM SELECTOR PLAN 9
HSQ    Dispo: Cont to monitor on [facility] and plan for safe d/c to [facility] pending clinical improvement.  Code: DNR/DNI - see event note by Dr. James Gonzalez from today 3/27/17 HSQ    Dispo: Discharge to [facility] today.  Code: DNR/DNI - see event note by Dr. James Gonzalez from 3/27/17

## 2017-03-28 NOTE — DISCHARGE NOTE ADULT - CARE PLAN
Principal Discharge DX:	Fall, initial encounter  Goal:	Discharge to subacute rehab.  Instructions for follow-up, activity and diet:	You had a fall at home, you will benefit from rehabilitation. Please follow up with Dr. Fuller afterwards.  Secondary Diagnosis:	Hypertension  Instructions for follow-up, activity and diet:	Please take amlodipine 5mg, it is important for maintaining blood pressure to prevent strokes.  Secondary Diagnosis:	Hypothyroidism  Instructions for follow-up, activity and diet:	Continue daily synthroid.  Secondary Diagnosis:	Leukocytosis  Instructions for follow-up, activity and diet:	You had increased white blood cell count. Please follow up with Dr. Snider for further evaluation.  Secondary Diagnosis:	CVA (cerebral vascular accident)  Instructions for follow-up, activity and diet:	Your head CT showed you had an old stroke in your left thalamus. It was likely due to uncontrolled high blood pressure. Please follow up with Dr. Fuller.  Secondary Diagnosis:	Osteoarthritis  Instructions for follow-up, activity and diet:	You have chronic erosive changes in your bones, specifically your left knee which as some fluid in it. If the pain/swelling does not improve after rehab, please follow up with Dr. Miller for further evaluation.

## 2017-03-28 NOTE — PROGRESS NOTE ADULT - PROBLEM SELECTOR PLAN 1
Likely mechanical per history, XR left shoulder showed hypertrophic arthropathy but no fracture. Head CT negative for acute change but found old CVA.  - EKG shows NSR, will had small trops peaked at 0.227 likely demand ischemia - no anginal sx  - Pt frail and lives alone - likely not safe d/c home, PT recommended [facility]  - No longer on IV fluids since AM of 3/27 Likely mechanical per history, XR left shoulder showed hypertrophic arthropathy but no fracture. Head CT negative for acute change but found old CVA.  - EKG shows NSR, will had small trops peaked at 0.227 likely demand ischemia - no anginal sx  - Pt frail and lives alone - likely not safe d/c home, PT recommended [facility]  - No longer on IV fluids since AM of 3/27    # Left knee pain: s/p possible trauma during bed transferring on 3/27, or from initial fall. L knee XR showing osteoarthritis most severe in medial aspect, with moderate effusion. Pain control and f/up with outpatient ortho after rehab

## 2017-03-28 NOTE — PROGRESS NOTE ADULT - SUBJECTIVE AND OBJECTIVE BOX
PGY-1 Medicine Progress Note    Overnight events: No acute events. Went for left knee x-ray, no official read yet.    Subjective: Pt seen and examined at bedside this morning. Pt states that L knee pain is still 7/10 and not worse. No pain below the knee. Otherwise denies chest pain, sob, diarrhea, rest of ROS negative.    Vital Signs Last 24 Hrs  T(C): 35.6, Max: 36.6 (03-27 @ 16:47)  T(F): 96, Max: 97.9 (03-28 @ 06:13)  HR: 60 (60 - 90)  BP: 143/91 (138/59 - 163/73)  BP(mean): --  RR: 18 (16 - 18)  SpO2: 98% (96% - 98%)    I&O's Summary    I & Os for current day (as of 28 Mar 2017 09:41)  =============================================  IN: 80 ml / OUT: 0 ml / NET: 80 ml    EXAM:  GEN: Alert, NAD, comfortable  LYMPH: several palpable anterior cervical lymph nodes about 0.5cm in diameter, mobile, firm, rounded, discrete, nontender. Patient also with notable supraclavicular lymphadenopathy and bilateral axillary lymphadenopathy of similar description.  HEENT: moist MM, no pharyngeal erythema or exudate, PERRL/EOMI  CV: RRR, S1/S2, no murmurs appreciated  RESP: fine b/l crackles up to mid lung (likely 2/2 IV fluids), good respiratory effort, no wheezes/rales  ABD: BS+, NTND, no guarding/rebound, firm but not rigid, nontender, with several discrete palpable subcutaneous nodules, possibly with +splenomegaly/hepatomegaly but difficult to ascertain. Also has vertical scar from "a surgery 60 years ago" that she doesn't remember the reason.  EXTREMITIES: WWP, pulses 2+ in all 4 extremities, no peripheral edema, able to flex right knee  Left Knee: no ROM to active/passive ROM. She has mild to moderate tenderness medial to the patella. There is nonpitting edema noted on the medial aspect of the left knee. DP pulses positive b/l. No warmth/erythema noted. No change in color of the skin. No tenderness below the knee.  NEURO: A&Ox2-3 (thinks it is 1917), left arm with cogwheel rigidity, no sensory defect    MEDICATIONS  (STANDING):  heparin  Injectable 5000Unit(s) SubCutaneous every 12 hours  levothyroxine 50MICROGram(s) Oral daily  amLODIPine   Tablet 5milliGRAM(s) Oral daily  latanoprost 0.005% Ophthalmic Solution 1Drop(s) Both EYES at bedtime    MEDICATIONS  (PRN):  acetaminophen   Tablet. 650milliGRAM(s) Oral every 6 hours PRN Moderate Pain (4 - 6)    Allergies    No Known Allergies    Intolerances    Labs: reviewed.                          11.7   15.5  )-----------( 247      ( 26 Mar 2017 06:41 )             35.9     26 Mar 2017 06:41    142    |  113    |  51     ----------------------------<  75     5.1     |  19     |  1.40     Ca    8.3        26 Mar 2017 06:41  Mg     1.9       26 Mar 2017 06:41    TPro  6.9    /  Alb  2.9    /  TBili  0.7    /  DBili  x      /  AST  12     /  ALT  13     /  AlkPhos  100    26 Mar 2017 06:41    PT/INR - ( 25 Mar 2017 20:44 )   PT: 12.0 sec;   INR: 1.08          PTT - ( 25 Mar 2017 20:44 )  PTT:33.2 sec      Radiology and additional tests: reviewed.

## 2017-03-28 NOTE — DISCHARGE NOTE ADULT - MEDICATION SUMMARY - MEDICATIONS TO TAKE
I will START or STAY ON the medications listed below when I get home from the hospital:    acetaminophen 325 mg oral tablet  -- 2 tab(s) by mouth every 6 hours, As needed, Moderate Pain (4 - 6)  -- Indication: For For left shoulder/knee pain    amLODIPine 5 mg oral tablet  -- 1 tab(s) by mouth once a day  -- Indication: For Hypertension    latanoprost 0.005% ophthalmic solution  -- 1 drop(s) to each affected eye once a day (at bedtime)  -- Indication: For Glaucoma    Synthroid 50 mcg (0.05 mg) oral tablet  -- 1 tab(s) by mouth once a day  -- Indication: For Hypothyroidism

## 2017-03-28 NOTE — CONSULT NOTE ADULT - SUBJECTIVE AND OBJECTIVE BOX
HPI:  Patient is a 89 yo F with PMHx of dementia, hypothyroidism, HLD, hypercoagulable state, ?movement disorder, chronic left shoulder dislocation who presented to ED today after having fall in bathroom. Pt states she was sitting and then fell over and landing on her front. She denies any injury. She remembers the incident and denies any prodromal symptoms including chest pain, palpitations, SOB, diaphoresis. She denies any URI infection symptoms, fevers, chills, abd pain complaints or urinary complaints. No rashes. She denies any neurologic symptoms    She only takes synthroid at home. Has stopped all other meds on her own bc of side effects she states. She was told she has a movement disorder but has not been taking anything for it (outpt med rec shows old script of Sinemet). She denies having CVA in past. She usually ambulates with a walker. She has someone come once a week to clean her apartment but other wise she manages her own affairs including cooking. She lives in an elevator building. (25 Mar 2017 23:36)    FAMILY HISTORY:    MEDICATIONS  (STANDING):  heparin  Injectable 5000Unit(s) SubCutaneous every 12 hours  levothyroxine 50MICROGram(s) Oral daily  amLODIPine   Tablet 5milliGRAM(s) Oral daily  latanoprost 0.005% Ophthalmic Solution 1Drop(s) Both EYES at bedtime    MEDICATIONS  (PRN):  acetaminophen   Tablet. 650milliGRAM(s) Oral every 6 hours PRN Moderate Pain (4 - 6)    Vital Signs Last 24 Hrs  T(C): 35.6, Max: 36.6 (03-27 @ 16:47)  T(F): 96, Max: 97.9 (03-28 @ 06:13)  HR: 60 (60 - 90)  BP: 143/91 (143/91 - 163/73)  BP(mean): --  RR: 18 (17 - 18)  SpO2: 98% (96% - 98%)PHYSICAL EXAM:    Constitutional:    Neck:    Breasts:    Respiratory:    Cardiovascular:    Gastrointestinal:    Extremities:    Neurological:    Skin:    Lymph Nodes:      Labs:  CBC Full  -  ( 27 Mar 2017 11:02 )  WBC Count : 12.5 K/uL  Hemoglobin : 11.5 g/dL  Hematocrit : 35.9 %  Platelet Count - Automated : 224 K/uL  Mean Cell Volume : 93.2 fL  Mean Cell Hemoglobin : 29.9 pg  Mean Cell Hemoglobin Concentration : 32.0 g/dL  Auto Neutrophil # : x  Auto Lymphocyte # : x  Auto Monocyte # : x  Auto Eosinophil # : x  Auto Basophil # : x  Auto Neutrophil % : 38.0 %  Auto Lymphocyte % : 57.0 %  Auto Monocyte % : 1.0 %  Auto Eosinophil % : 2.0 %  Auto Basophil % : x    27 Mar 2017 11:02    142    |  115    |  40     ----------------------------<  136    4.7     |  19     |  1.20     Ca    8.2        27 Mar 2017 11:02  Mg     1.8       27 Mar 2017 11:02        Radiology:  HEALTH ISSUES - R/O PROBLEM Dx:    Assessmant:  1)patient known to me for hyperhomocysteinemia with previous CVA  2) Leukocytosis with 57% lymphocyte predominance ( absolute lymphocyte count 7125)  R/O CLL  Plan:  1) homocysteine level  2)Vitamin B 12 level  3) peripheral blood flow cytometry    Thank you  Roxann Snider MD

## 2017-03-28 NOTE — PROGRESS NOTE ADULT - PROBLEM SELECTOR PLAN 6
TSH wnl. No sx of clinical hypothyroidism.  - Continue Synthroid 50mcg daily for now - dose is correct per Dr. Fuller

## 2017-03-28 NOTE — PROGRESS NOTE ADULT - PROBLEM SELECTOR PLAN 5
Chronic right thalamic lacunar stroke found on CT head, however no acute stroke.  -patient would benefit from aspirin and possibly statin however given's patient age and non-compliance pt may not be ideal candidate for starting new medications  -will check lipid panel Chronic right thalamic lacunar stroke found on CT head, however no acute stroke.  -patient would benefit from aspirin and possibly statin however given's patient age and non-compliance pt may not be ideal candidate for starting new medications

## 2017-03-28 NOTE — DISCHARGE NOTE ADULT - OTHER SIGNIFICANT FINDINGS
Echocardiogram (3/28/17):  Left Ventricle  There is mild concentric left ventricular hypertrophy.  Mild global hypokinesis of the left ventricle noted, with more severe  hypokinesis of the inferolateral wall and basal lateral wall.  The left ventricular ejection fraction is mildly reduced.  The left ventricular ejection fraction is 45%.  Left Atrium  The mitral inflow pattern is consistent with impaired left ventricular  relaxation with mildly elevated left atrial pressure (8-14mmHg).  The left atrial size is normal.  The left atrial volume index is 23 cc/m2 (normal <34cc/m2)  Right Atrium  Right atrial size is normal.  Right Ventricle  The right ventricle is normal in size and function.  Aortic Valve  Calcified aortic valve.  There is mild aortic regurgitation.  There is Mild aortic stenosis.  The calculated aortic valve area using the continuity equation is 1.5 cm2.  The peak pressure gradient is 24 mmHg.  The mean pressure gradient is 16 mmHg.  Mitral Valve  There is mild mitral annular calcification.  There is mild to moderate mitral regurgitation.  Tricuspid Valve  There is trace tricuspid regurgitation.  There is no echocardiographic evidence for pulmonary hypertension.  The pulmonary artery systolic pressure is estimated to be 25 mmHg.  Arteries and Venous System  No aortic root dilatation.  The inferior vena cava is normal in size (<2.1 cm) with normal inspiratory  collapse (>50%) consistent with normal right atrial pressure.  Pericardium / Pleura  A small pericardial effusion noted.

## 2017-03-28 NOTE — PROGRESS NOTE ADULT - PROBLEM SELECTOR PLAN 4
Patient with history of HTN, not taking medications. Hypertensive urgency (asymptomatic) while on IV fluids, now normotensive after discontinuing IV fluids.  - continue norvasc 5mg and promote compliance

## 2017-03-28 NOTE — DISCHARGE NOTE ADULT - HOSPITAL COURSE
89 yo F who was admitted to [facility] on 3/25/17. She is with PMHx of dementia, hypothyroidism, HLD, hypercoagulable state, glaucoma, ?movement disorder, chronic left shoulder dislocation, chronic L thalamic lacunar infarct, who presented to ED after mechanical fall in bathroom at home. Non-compliant with meds at home, only takes Synthroid 50mcg daily. On admission, pt hypertensive and started on amlodipine 5mg daily. Labs notable for troponin which peaked at 0.2 likely 2/2 demand, EKG NSR with no ST/T ischemic changes. XR left shoulder showed hypertrophic arthopathy but no fracture. CT head negative for bleed but showed chronic L thalamic lacunar infarct. Started on IV fluids for dehydration, d/c’ed after tolerating PO diet. Incidentally found to have leukocytosis with lymphocytic predominance with smudge cells on smear, physical exam consistent with possible CLL (diffuse lymphadenopathy), heme/onc (Dr. Snider) consulted and to follow as outpatient. Pt c/o left knee pain after transferring in bed on 3/27, left knee XR showed no fracture but seen to show osteoarthritis most severe in medial aspect with moderate joint effusion. No signs of compartment syndrome. Went for echocardiogram on 3/28, results pending at this time. On 3/28, patient afebrile and HD stable for discharge to [facility] to f/up afterwards with Dr. Fuller (PMD/GI), Dr. Snider (heme/onc), and orthopedics if left knee pain does not improve after rehabilitation. 89 yo F who was admitted to [facility] on 3/25/17. She is with PMHx of dementia, hypothyroidism, HLD, hypercoagulable state, glaucoma, ?movement disorder, chronic left shoulder dislocation, chronic L thalamic lacunar infarct, who presented to ED after mechanical fall in bathroom at home. Non-compliant with meds at home, only takes Synthroid 50mcg daily. On admission, pt hypertensive and started on amlodipine 5mg daily. Labs notable for troponin which peaked at 0.2 likely 2/2 demand, EKG NSR with no ST/T ischemic changes. XR left shoulder showed hypertrophic arthopathy but no fracture. CT head negative for bleed but showed chronic L thalamic lacunar infarct. Started on IV fluids for dehydration, d/c’ed after tolerating PO diet. Patient expressed with for DNR/DNI, documented in the chart. Incidentally found to have leukocytosis with lymphocytic predominance with smudge cells on smear, physical exam consistent with possible CLL (diffuse lymphadenopathy), heme/onc (Dr. Snider) consulted and to follow as outpatient. Pt c/o left knee pain after transferring in bed on 3/27, left knee XR showed no fracture but seen to show osteoarthritis most severe in medial aspect with moderate joint effusion. No signs of compartment syndrome. Went for echocardiogram on 3/28, results pending at this time. On 3/28, patient afebrile and HD stable for discharge to [facility], to f/up afterwards with Dr. Fuller (PMD/GI), Dr. Snider (heme/onc), and orthopedics if left knee pain does not improve after rehabilitation.

## 2017-03-28 NOTE — DISCHARGE NOTE ADULT - PLAN OF CARE
Discharge to subacute rehab. You had a fall at home, you will benefit from rehabilitation. Please follow up with Dr. Fuller afterwards. Please take amlodipine 5mg, it is important for maintaining blood pressure to prevent strokes. Continue daily synthroid. Your head CT showed you had an old stroke in your left thalamus. It was likely due to uncontrolled high blood pressure. Please follow up with Dr. Fuller. You had increased white blood cell count. Please follow up with Dr. Snider for further evaluation. You have chronic erosive changes in your bones, specifically your left knee which as some fluid in it. If the pain/swelling does not improve after rehab, please follow up with Dr. Miller for further evaluation.

## 2017-03-28 NOTE — PROGRESS NOTE ADULT - PROBLEM SELECTOR PLAN 2
Unclear baseline, possibly CKD. No urinary frequency/incontinence/dysuria. Cr 1.47 on admission, now down to 1.20.  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 Fuller baseline labs  - renally adjust all meds and avoid nephrotoxic agents

## 2017-03-28 NOTE — DISCHARGE NOTE ADULT - ADDITIONAL INSTRUCTIONS
Please call  to make appointment with Dr. Snider (hematologist) for further workup of your blood count in 1-2 weeks.  Please call  to make appointment with Dr. Fuller (GI/PMD) for follow up in 1-2 weeks.  If left knee/shoulder do not improve after rehabilitation, please call  to make appointment with Dr. Miller (orthopedic surgery) for follow-up.

## 2017-03-28 NOTE — DISCHARGE NOTE ADULT - CARE PROVIDERS DIRECT ADDRESSES
,[email or web address],DirectAddress_Unknown,[email or web address],[email or web address]

## 2017-03-28 NOTE — PROGRESS NOTE ADULT - SUBJECTIVE AND OBJECTIVE BOX
Pt seen and examined,  no new complaints    REVIEW OF SYSTEMS:  Constitutional: No fever, weight loss or fatigue  Cardiovascular: No chest pain, palpitations, dizziness or leg swelling  Gastrointestinal: No abdominal or epigastric pain. No nausea, vomiting or hematemesis; No diarrhea or constipation. No melena or hematochezia.  Skin: No itching, burning, rashes or lesions       MEDICATIONS:  MEDICATIONS  (STANDING):  heparin  Injectable 5000Unit(s) SubCutaneous every 12 hours  levothyroxine 50MICROGram(s) Oral daily  amLODIPine   Tablet 5milliGRAM(s) Oral daily  latanoprost 0.005% Ophthalmic Solution 1Drop(s) Both EYES at bedtime    MEDICATIONS  (PRN):  acetaminophen   Tablet. 650milliGRAM(s) Oral every 6 hours PRN Moderate Pain (4 - 6)      Allergies    No Known Allergies    Intolerances        Vital Signs Last 24 Hrs  T(C): 35.6, Max: 36.6 ( @ 16:47)  T(F): 96, Max: 97.9 ( @ 06:13)  HR: 60 (60 - 90)  BP: 143/91 (143/91 - 163/73)  BP(mean): --  RR: 18 (17 - 18)  SpO2: 98% (96% - 98%)    I & Os for current day (as of  @ 09:59)  =============================================  IN: 80 ml / OUT: 0 ml / NET: 80 ml      PHYSICAL EXAM:    General: elderly; in no acute distress AOx3  HEENT: MMM, conjunctiva and sclera clear  Lungs: clear  Heart: regular + murmur  Gastrointestinal: Soft non-tender non-distended; Normal bowel sounds; No hepatosplenomegaly  Skin: Warm and dry. No obvious rash    LABS:      CBC Full  -  ( 27 Mar 2017 11:02 )  WBC Count : 12.5 K/uL  Hemoglobin : 11.5 g/dL  Hematocrit : 35.9 %  Platelet Count - Automated : 224 K/uL  Mean Cell Volume : 93.2 fL  Mean Cell Hemoglobin : 29.9 pg  Mean Cell Hemoglobin Concentration : 32.0 g/dL  Auto Neutrophil # : x  Auto Lymphocyte # : x  Auto Monocyte # : x  Auto Eosinophil # : x  Auto Basophil # : x  Auto Neutrophil % : 38.0 %  Auto Lymphocyte % : 57.0 %  Auto Monocyte % : 1.0 %  Auto Eosinophil % : 2.0 %  Auto Basophil % : x    27 Mar 2017 11:02    142    |  115    |  40     ----------------------------<  136    4.7     |  19     |  1.20     Ca    8.2        27 Mar 2017 11:02  Mg     1.8       27 Mar 2017 11:02            Urinalysis Basic - ( 27 Mar 2017 18:15 )    Color: Yellow / Appearance: Clear / S.015 / pH: x  Gluc: x / Ketone: NEGATIVE  / Bili: NEGATIVE / Urobili: 0.2 E.U./dL   Blood: x / Protein: NEGATIVE mg/dL / Nitrite: NEGATIVE   Leuk Esterase: NEGATIVE / RBC: < 5 /HPF / WBC < 5 /HPF   Sq Epi: x / Non Sq Epi: Rare /HPF / Bacteria: Present /HPF                RADIOLOGY & ADDITIONAL STUDIES (The following images were personally reviewed):

## 2017-03-29 LAB
IGA FLD-MCNC: 102 MG/DL — SIGNIFICANT CHANGE UP (ref 68–378)
IGG FLD-MCNC: 1930 MG/DL — HIGH (ref 694–1618)
IGM SERPL-MCNC: 52 MG/DL — SIGNIFICANT CHANGE UP (ref 40–230)
KAPPA LC SER QL IFE: 9.76 MG/DL — HIGH (ref 0.33–1.94)
KAPPA/LAMBDA FREE LIGHT CHAIN RATIO, SERUM: 1.37 RATIO — SIGNIFICANT CHANGE UP (ref 0.26–1.65)
LAMBDA LC SER QL IFE: 7.13 MG/DL — HIGH (ref 0.57–2.63)

## 2017-03-30 DIAGNOSIS — R59.1 GENERALIZED ENLARGED LYMPH NODES: ICD-10-CM

## 2017-03-30 DIAGNOSIS — Z86.73 PERSONAL HISTORY OF TRANSIENT ISCHEMIC ATTACK (TIA), AND CEREBRAL INFARCTION WITHOUT RESIDUAL DEFICITS: ICD-10-CM

## 2017-03-30 DIAGNOSIS — G25.9 EXTRAPYRAMIDAL AND MOVEMENT DISORDER, UNSPECIFIED: ICD-10-CM

## 2017-03-30 DIAGNOSIS — H40.9 UNSPECIFIED GLAUCOMA: ICD-10-CM

## 2017-03-30 DIAGNOSIS — F03.90 UNSPECIFIED DEMENTIA, UNSPECIFIED SEVERITY, WITHOUT BEHAVIORAL DISTURBANCE, PSYCHOTIC DISTURBANCE, MOOD DISTURBANCE, AND ANXIETY: ICD-10-CM

## 2017-03-30 DIAGNOSIS — D68.59 OTHER PRIMARY THROMBOPHILIA: ICD-10-CM

## 2017-03-30 DIAGNOSIS — R26.9 UNSPECIFIED ABNORMALITIES OF GAIT AND MOBILITY: ICD-10-CM

## 2017-03-30 DIAGNOSIS — I10 ESSENTIAL (PRIMARY) HYPERTENSION: ICD-10-CM

## 2017-03-30 DIAGNOSIS — E03.9 HYPOTHYROIDISM, UNSPECIFIED: ICD-10-CM

## 2017-03-30 DIAGNOSIS — Z91.14 PATIENT'S OTHER NONCOMPLIANCE WITH MEDICATION REGIMEN: ICD-10-CM

## 2017-03-30 DIAGNOSIS — M17.12 UNILATERAL PRIMARY OSTEOARTHRITIS, LEFT KNEE: ICD-10-CM

## 2017-03-30 DIAGNOSIS — R01.1 CARDIAC MURMUR, UNSPECIFIED: ICD-10-CM

## 2017-03-30 DIAGNOSIS — N17.9 ACUTE KIDNEY FAILURE, UNSPECIFIED: ICD-10-CM

## 2017-03-30 DIAGNOSIS — R79.89 OTHER SPECIFIED ABNORMAL FINDINGS OF BLOOD CHEMISTRY: ICD-10-CM

## 2017-03-30 DIAGNOSIS — R53.1 WEAKNESS: ICD-10-CM

## 2017-03-30 DIAGNOSIS — D72.829 ELEVATED WHITE BLOOD CELL COUNT, UNSPECIFIED: ICD-10-CM

## 2017-03-30 DIAGNOSIS — E78.5 HYPERLIPIDEMIA, UNSPECIFIED: ICD-10-CM

## 2017-03-30 DIAGNOSIS — Z66 DO NOT RESUSCITATE: ICD-10-CM

## 2017-03-30 LAB
% ALBUMIN: 48.2 % — SIGNIFICANT CHANGE UP
% ALPHA 1: 5.5 % — SIGNIFICANT CHANGE UP
% ALPHA 2: 14.2 % — SIGNIFICANT CHANGE UP
% BETA: 8.2 % — SIGNIFICANT CHANGE UP
% GAMMA: 23.9 % — SIGNIFICANT CHANGE UP
% M SPIKE: 8.1 % — SIGNIFICANT CHANGE UP
ALBUMIN SERPL ELPH-MCNC: 3.2 G/DL — LOW (ref 3.6–5.5)
ALBUMIN/GLOB SERPL ELPH: 0.9 RATIO — SIGNIFICANT CHANGE UP
ALPHA1 GLOB SERPL ELPH-MCNC: 0.4 G/DL — SIGNIFICANT CHANGE UP (ref 0.1–0.4)
ALPHA2 GLOB SERPL ELPH-MCNC: 1 G/DL — SIGNIFICANT CHANGE UP (ref 0.5–1)
B-GLOBULIN SERPL ELPH-MCNC: 0.5 G/DL — SIGNIFICANT CHANGE UP (ref 0.5–1)
GAMMA GLOBULIN: 1.6 G/DL — SIGNIFICANT CHANGE UP (ref 0.6–1.6)
INTERPRETATION SERPL IFE-IMP: SIGNIFICANT CHANGE UP
M-SPIKE: 0.5 G/DL — HIGH (ref 0–0)
PROT PATTERN SERPL ELPH-IMP: SIGNIFICANT CHANGE UP
PROT SERPL-MCNC: 6.7 G/DL — SIGNIFICANT CHANGE UP (ref 6–8.3)
PROT SERPL-MCNC: 6.7 G/DL — SIGNIFICANT CHANGE UP (ref 6–8.3)

## 2017-04-24 ENCOUNTER — APPOINTMENT (OUTPATIENT)
Dept: ORTHOPEDIC SURGERY | Facility: CLINIC | Age: 82
End: 2017-04-24

## 2019-01-31 NOTE — CONSULT NOTE ADULT - ASSESSMENT
31-Jan-2019 Patient is a 89 yo F with PMHx of dementia, hypothyroidism, HLD, hypercoagulable state, ?movement disorder, chronic left shoulder dislocation with fall.     Problem/Plan - 1:  ·  Problem: Fall, initial encounter.  Plan: -unclear etiology currently. Initial workup shows possible infection with white count although patient has no symptoms. Will get UA, CXR clear.  -EKG shows NSR, will r/o ACS with trops X2.   -Will get TSH  -will check orthostatics although seems as if patient was sitting during episode  -Pt frail and lives alone-will get PT consult and social work involved.     Problem/Plan - 2:  ·  Problem: Acute kidney injury.  Plan: -will get urine lytes, pt more on dry side on exam  -has no urinary symptoms  -will get collateral from Dr. Fuller in AM for baseline labs  -renally adjust all meds and avoid nephrotoxic agents.     Problem/Plan - 3:  ·  Problem: Leukocytosis.  Plan: -no clear source of infection  -will get UA  -monitor clinical status.
